# Patient Record
Sex: FEMALE | Race: WHITE | HISPANIC OR LATINO
[De-identification: names, ages, dates, MRNs, and addresses within clinical notes are randomized per-mention and may not be internally consistent; named-entity substitution may affect disease eponyms.]

---

## 2018-02-05 ENCOUNTER — APPOINTMENT (OUTPATIENT)
Dept: SURGERY | Facility: CLINIC | Age: 48
End: 2018-02-05

## 2022-02-07 ENCOUNTER — RESULT REVIEW (OUTPATIENT)
Age: 52
End: 2022-02-07

## 2022-02-07 ENCOUNTER — INPATIENT (INPATIENT)
Facility: HOSPITAL | Age: 52
LOS: 9 days | Discharge: HOME | DRG: 853 | End: 2022-02-17
Attending: INTERNAL MEDICINE | Admitting: HOSPITALIST
Payer: COMMERCIAL

## 2022-02-07 VITALS
DIASTOLIC BLOOD PRESSURE: 88 MMHG | RESPIRATION RATE: 30 BRPM | WEIGHT: 293 LBS | SYSTOLIC BLOOD PRESSURE: 149 MMHG | TEMPERATURE: 98 F | HEART RATE: 116 BPM

## 2022-02-07 DIAGNOSIS — F90.9 ATTENTION-DEFICIT HYPERACTIVITY DISORDER, UNSPECIFIED TYPE: ICD-10-CM

## 2022-02-07 DIAGNOSIS — R73.9 HYPERGLYCEMIA, UNSPECIFIED: ICD-10-CM

## 2022-02-07 DIAGNOSIS — Z98.84 BARIATRIC SURGERY STATUS: Chronic | ICD-10-CM

## 2022-02-07 DIAGNOSIS — A41.9 SEPSIS, UNSPECIFIED ORGANISM: ICD-10-CM

## 2022-02-07 DIAGNOSIS — F41.9 ANXIETY DISORDER, UNSPECIFIED: ICD-10-CM

## 2022-02-07 DIAGNOSIS — J90 PLEURAL EFFUSION, NOT ELSEWHERE CLASSIFIED: ICD-10-CM

## 2022-02-07 DIAGNOSIS — Z29.9 ENCOUNTER FOR PROPHYLACTIC MEASURES, UNSPECIFIED: ICD-10-CM

## 2022-02-07 DIAGNOSIS — E66.01 MORBID (SEVERE) OBESITY DUE TO EXCESS CALORIES: ICD-10-CM

## 2022-02-07 LAB
ALBUMIN SERPL ELPH-MCNC: 3.4 G/DL — SIGNIFICANT CHANGE UP (ref 3.3–5)
ALP SERPL-CCNC: 99 U/L — SIGNIFICANT CHANGE UP (ref 40–120)
ALT FLD-CCNC: 15 U/L — SIGNIFICANT CHANGE UP (ref 10–45)
ANION GAP SERPL CALC-SCNC: 15 MMOL/L — SIGNIFICANT CHANGE UP (ref 5–17)
APTT BLD: 30.2 SEC — SIGNIFICANT CHANGE UP (ref 27.5–35.5)
AST SERPL-CCNC: 8 U/L — LOW (ref 10–40)
BASOPHILS # BLD AUTO: 0 K/UL — SIGNIFICANT CHANGE UP (ref 0–0.2)
BASOPHILS NFR BLD AUTO: 0 % — SIGNIFICANT CHANGE UP (ref 0–2)
BILIRUB SERPL-MCNC: 1.1 MG/DL — SIGNIFICANT CHANGE UP (ref 0.2–1.2)
BUN SERPL-MCNC: 24 MG/DL — HIGH (ref 7–23)
CALCIUM SERPL-MCNC: 9.2 MG/DL — SIGNIFICANT CHANGE UP (ref 8.4–10.5)
CHLORIDE SERPL-SCNC: 94 MMOL/L — LOW (ref 96–108)
CO2 SERPL-SCNC: 21 MMOL/L — LOW (ref 22–31)
CREAT SERPL-MCNC: 1.11 MG/DL — SIGNIFICANT CHANGE UP (ref 0.5–1.3)
EOSINOPHIL # BLD AUTO: 0 K/UL — SIGNIFICANT CHANGE UP (ref 0–0.5)
EOSINOPHIL NFR BLD AUTO: 0 % — SIGNIFICANT CHANGE UP (ref 0–6)
FLUAV AG NPH QL: SIGNIFICANT CHANGE UP
FLUBV AG NPH QL: SIGNIFICANT CHANGE UP
GAS PNL BLDV: SIGNIFICANT CHANGE UP
GLUCOSE SERPL-MCNC: 184 MG/DL — HIGH (ref 70–99)
HCG SERPL-ACNC: <2 MIU/ML — SIGNIFICANT CHANGE UP
HCT VFR BLD CALC: 40.1 % — SIGNIFICANT CHANGE UP (ref 34.5–45)
HGB BLD-MCNC: 12.8 G/DL — SIGNIFICANT CHANGE UP (ref 11.5–15.5)
INR BLD: 1.35 RATIO — HIGH (ref 0.88–1.16)
LIDOCAIN IGE QN: 17 U/L — SIGNIFICANT CHANGE UP (ref 7–60)
LYMPHOCYTES # BLD AUTO: 1.14 K/UL — SIGNIFICANT CHANGE UP (ref 1–3.3)
LYMPHOCYTES # BLD AUTO: 6 % — LOW (ref 13–44)
MCHC RBC-ENTMCNC: 26.7 PG — LOW (ref 27–34)
MCHC RBC-ENTMCNC: 31.9 GM/DL — LOW (ref 32–36)
MCV RBC AUTO: 83.7 FL — SIGNIFICANT CHANGE UP (ref 80–100)
MONOCYTES # BLD AUTO: 1.64 K/UL — HIGH (ref 0–0.9)
MONOCYTES NFR BLD AUTO: 8.6 % — SIGNIFICANT CHANGE UP (ref 2–14)
NEUTROPHILS # BLD AUTO: 16.26 K/UL — HIGH (ref 1.8–7.4)
NEUTROPHILS NFR BLD AUTO: 85.4 % — HIGH (ref 43–77)
PLATELET # BLD AUTO: 397 K/UL — SIGNIFICANT CHANGE UP (ref 150–400)
POTASSIUM SERPL-MCNC: 4 MMOL/L — SIGNIFICANT CHANGE UP (ref 3.5–5.3)
POTASSIUM SERPL-SCNC: 4 MMOL/L — SIGNIFICANT CHANGE UP (ref 3.5–5.3)
PROT SERPL-MCNC: 7.3 G/DL — SIGNIFICANT CHANGE UP (ref 6–8.3)
PROTHROM AB SERPL-ACNC: 16 SEC — HIGH (ref 10.6–13.6)
RBC # BLD: 4.79 M/UL — SIGNIFICANT CHANGE UP (ref 3.8–5.2)
RBC # FLD: 13.4 % — SIGNIFICANT CHANGE UP (ref 10.3–14.5)
RSV RNA NPH QL NAA+NON-PROBE: SIGNIFICANT CHANGE UP
SARS-COV-2 RNA SPEC QL NAA+PROBE: SIGNIFICANT CHANGE UP
SODIUM SERPL-SCNC: 130 MMOL/L — LOW (ref 135–145)
TROPONIN T, HIGH SENSITIVITY RESULT: 9 NG/L — SIGNIFICANT CHANGE UP (ref 0–51)
WBC # BLD: 19.04 K/UL — HIGH (ref 3.8–10.5)
WBC # FLD AUTO: 19.04 K/UL — HIGH (ref 3.8–10.5)

## 2022-02-07 PROCEDURE — 99254 IP/OBS CNSLTJ NEW/EST MOD 60: CPT

## 2022-02-07 PROCEDURE — 99291 CRITICAL CARE FIRST HOUR: CPT

## 2022-02-07 PROCEDURE — 74177 CT ABD & PELVIS W/CONTRAST: CPT | Mod: 26,MA

## 2022-02-07 PROCEDURE — 71250 CT THORAX DX C-: CPT | Mod: 26,MA

## 2022-02-07 PROCEDURE — 99223 1ST HOSP IP/OBS HIGH 75: CPT

## 2022-02-07 PROCEDURE — 71046 X-RAY EXAM CHEST 2 VIEWS: CPT | Mod: 26

## 2022-02-07 RX ORDER — MIDAZOLAM HYDROCHLORIDE 1 MG/ML
2 INJECTION, SOLUTION INTRAMUSCULAR; INTRAVENOUS ONCE
Refills: 0 | Status: DISCONTINUED | OUTPATIENT
Start: 2022-02-07 | End: 2022-02-07

## 2022-02-07 RX ORDER — CEFTRIAXONE 500 MG/1
1000 INJECTION, POWDER, FOR SOLUTION INTRAMUSCULAR; INTRAVENOUS ONCE
Refills: 0 | Status: COMPLETED | OUTPATIENT
Start: 2022-02-07 | End: 2022-02-07

## 2022-02-07 RX ORDER — OXYCODONE HYDROCHLORIDE 5 MG/1
5 TABLET ORAL EVERY 4 HOURS
Refills: 0 | Status: DISCONTINUED | OUTPATIENT
Start: 2022-02-07 | End: 2022-02-11

## 2022-02-07 RX ORDER — DEXTROAMPHETAMINE SACCHARATE, AMPHETAMINE ASPARTATE, DEXTROAMPHETAMINE SULFATE AND AMPHETAMINE SULFATE 1.875; 1.875; 1.875; 1.875 MG/1; MG/1; MG/1; MG/1
20 TABLET ORAL
Refills: 0 | Status: DISCONTINUED | OUTPATIENT
Start: 2022-02-07 | End: 2022-02-11

## 2022-02-07 RX ORDER — BUPROPION HYDROCHLORIDE 150 MG/1
300 TABLET, EXTENDED RELEASE ORAL DAILY
Refills: 0 | Status: DISCONTINUED | OUTPATIENT
Start: 2022-02-07 | End: 2022-02-11

## 2022-02-07 RX ORDER — MORPHINE SULFATE 50 MG/1
4 CAPSULE, EXTENDED RELEASE ORAL EVERY 4 HOURS
Refills: 0 | Status: DISCONTINUED | OUTPATIENT
Start: 2022-02-07 | End: 2022-02-11

## 2022-02-07 RX ORDER — CEFTRIAXONE 500 MG/1
1000 INJECTION, POWDER, FOR SOLUTION INTRAMUSCULAR; INTRAVENOUS EVERY 24 HOURS
Refills: 0 | Status: DISCONTINUED | OUTPATIENT
Start: 2022-02-08 | End: 2022-02-11

## 2022-02-07 RX ORDER — AZITHROMYCIN 500 MG/1
500 TABLET, FILM COATED ORAL ONCE
Refills: 0 | Status: COMPLETED | OUTPATIENT
Start: 2022-02-07 | End: 2022-02-07

## 2022-02-07 RX ORDER — ACETAMINOPHEN 500 MG
650 TABLET ORAL EVERY 6 HOURS
Refills: 0 | Status: DISCONTINUED | OUTPATIENT
Start: 2022-02-07 | End: 2022-02-11

## 2022-02-07 RX ORDER — MORPHINE SULFATE 50 MG/1
4 CAPSULE, EXTENDED RELEASE ORAL ONCE
Refills: 0 | Status: DISCONTINUED | OUTPATIENT
Start: 2022-02-07 | End: 2022-02-07

## 2022-02-07 RX ORDER — AZITHROMYCIN 500 MG/1
500 TABLET, FILM COATED ORAL EVERY 24 HOURS
Refills: 0 | Status: DISCONTINUED | OUTPATIENT
Start: 2022-02-08 | End: 2022-02-11

## 2022-02-07 RX ORDER — SODIUM CHLORIDE 9 MG/ML
1000 INJECTION INTRAMUSCULAR; INTRAVENOUS; SUBCUTANEOUS ONCE
Refills: 0 | Status: COMPLETED | OUTPATIENT
Start: 2022-02-07 | End: 2022-02-07

## 2022-02-07 RX ORDER — ONDANSETRON 8 MG/1
4 TABLET, FILM COATED ORAL ONCE
Refills: 0 | Status: COMPLETED | OUTPATIENT
Start: 2022-02-07 | End: 2022-02-07

## 2022-02-07 RX ADMIN — SODIUM CHLORIDE 1000 MILLILITER(S): 9 INJECTION INTRAMUSCULAR; INTRAVENOUS; SUBCUTANEOUS at 13:48

## 2022-02-07 RX ADMIN — CEFTRIAXONE 100 MILLIGRAM(S): 500 INJECTION, POWDER, FOR SOLUTION INTRAMUSCULAR; INTRAVENOUS at 17:46

## 2022-02-07 RX ADMIN — ONDANSETRON 4 MILLIGRAM(S): 8 TABLET, FILM COATED ORAL at 13:48

## 2022-02-07 RX ADMIN — MIDAZOLAM HYDROCHLORIDE 2 MILLIGRAM(S): 1 INJECTION, SOLUTION INTRAMUSCULAR; INTRAVENOUS at 23:08

## 2022-02-07 RX ADMIN — MORPHINE SULFATE 4 MILLIGRAM(S): 50 CAPSULE, EXTENDED RELEASE ORAL at 13:48

## 2022-02-07 RX ADMIN — MORPHINE SULFATE 4 MILLIGRAM(S): 50 CAPSULE, EXTENDED RELEASE ORAL at 20:31

## 2022-02-07 RX ADMIN — AZITHROMYCIN 255 MILLIGRAM(S): 500 TABLET, FILM COATED ORAL at 17:29

## 2022-02-07 NOTE — ED ADULT NURSE REASSESSMENT NOTE - NS ED NURSE REASSESS COMMENT FT1
pt to BR sob 02 sat decreased 88% r/a. Dyspnea. Made comfortable. Prior to BR 02 sat 93% r/a Oxygen delivered n/c 4 l 02 sat 97%. HOB elevated.

## 2022-02-07 NOTE — ED ADULT TRIAGE NOTE - CHIEF COMPLAINT QUOTE
Chest Pain beginning this AM radiating to L shoulder, SOB. Abdominal since Thursday worsening, lap band surgery in 2010

## 2022-02-07 NOTE — CONSULT NOTE ADULT - ASSESSMENT
51F with large left sided pleural effusion.    Recommendations:  - Will place a left sided thoracostomy tube    FULL CONSULT NOTE TO FOLLOW.    LAURA Carbone, PGY-2  [facility]  Thoracic Surgery  [id number] 51F with large left sided pleural effusion.    - Left sided thoracostomy tube placed, 250 cc straw colored fluid drained and sent for culture, cytology, cell count, protein, LDH  - Will follow    Thoracic Surgery  [id number]

## 2022-02-07 NOTE — CONSULT NOTE ADULT - SUBJECTIVE AND OBJECTIVE BOX
Thoracic Surgery Consult Note  Attending: Dr. Yulisa Willett  Service: Thoracic surgery  [id number]    HPI: 51F w/ PMHx of depression, obesity s/p lap band by Dr. Acevedo in 2010 who presents to the ED with a 4 day history of vague epigastric/LUQ pain that radiated to the left chest and scapula.    PAST MEDICAL & SURGICAL HISTORY:  No pertinent past medical history  H/O laparoscopic adjustable gastric banding    ALLERGIES:  No Known Allergies    PHYSICAL EXAM:  General: NAD, resting comfortably  HEENT: NC/AT, EOMI, normal hearing, no oral lesions, no LAD, neck supple  Pulmonary: Breathing comfortably  Cardiovascular: NSR, no murmurs  Abdominal: soft, mild LUQ tenderness, no organomegaly  Extremities: WWP, normal strength, no clubbing/cyanosis/edema  Pulses: palpable distal pulses    VITAL SIGNS:  Vital Signs Last 24 Hrs  T(C): 36.8 (07 Feb 2022 12:40), Max: 36.8 (07 Feb 2022 12:40)  T(F): 98.3 (07 Feb 2022 12:40), Max: 98.3 (07 Feb 2022 12:40)  HR: 110 (07 Feb 2022 13:54) (110 - 116)  BP: 153/85 (07 Feb 2022 13:54) (149/88 - 153/85)  BP(mean): --  RR: 22 (07 Feb 2022 13:54) (22 - 30)  SpO2: 97% (07 Feb 2022 13:54) (97% - 97%)    I&O's Summary    LABS:                        12.8   19.04 )-----------( 397      ( 07 Feb 2022 13:48 )             40.1     02-07    130<L>  |  94<L>  |  24<H>  ----------------------------<  184<H>  4.0   |  21<L>  |  1.11    Ca    9.2      07 Feb 2022 13:48    TPro  7.3  /  Alb  3.4  /  TBili  1.1  /  DBili  x   /  AST  8<L>  /  ALT  15  /  AlkPhos  99  02-07    PT/INR - ( 07 Feb 2022 13:48 )   PT: 16.0 sec;   INR: 1.35 ratio         PTT - ( 07 Feb 2022 13:48 )  PTT:30.2 sec    CAPILLARY BLOOD GLUCOSE        LIVER FUNCTIONS - ( 07 Feb 2022 13:48 )  Alb: 3.4 g/dL / Pro: 7.3 g/dL / ALK PHOS: 99 U/L / ALT: 15 U/L / AST: 8 U/L / GGT: x             CULTURES:      RADIOLOGY & ADDITIONAL STUDIES:             Thoracic Surgery Consult Note  Attending: Dr. Yulisa Willett  Service: Thoracic surgery  [id number]    HPI: 51F w/ PMHx of depression, obesity s/p lap band by Dr. Acevedo in 2010 who presents to the ED with a 4 day history of vague epigastric/LUQ pain that radiated to the left chest and scapula. Patient refers having poor PO intake, some nausea but no vomiting, passing gas and having BMs.     In the ED, patient was afebrile, tachycardic to 110s, normotensive, labs remarkable for leukocytosis of 19k, otherwise WNL. CTAP revealed oral contrast revealed adequate lap band placement, contrast in stomach, small bowel, and esophagus. Also, large left pleural effusion is partially appreciated. Thoracic surgery consulted for evaluation of pleural effusion.    PAST MEDICAL & SURGICAL HISTORY:  No pertinent past medical history  H/O laparoscopic adjustable gastric banding    ALLERGIES:  No Known Allergies    PHYSICAL EXAM:  General: NAD, resting comfortably  HEENT: NC/AT, EOMI, normal hearing, no oral lesions, no LAD, neck supple  Pulmonary: Breathing comfortably  Cardiovascular: NSR, no murmurs  Abdominal: soft, mild LUQ tenderness, no organomegaly  Extremities: WWP, normal strength, no clubbing/cyanosis/edema  Pulses: palpable distal pulses    VITAL SIGNS:  Vital Signs Last 24 Hrs  T(C): 36.8 (07 Feb 2022 12:40), Max: 36.8 (07 Feb 2022 12:40)  T(F): 98.3 (07 Feb 2022 12:40), Max: 98.3 (07 Feb 2022 12:40)  HR: 110 (07 Feb 2022 13:54) (110 - 116)  BP: 153/85 (07 Feb 2022 13:54) (149/88 - 153/85)  BP(mean): --  RR: 22 (07 Feb 2022 13:54) (22 - 30)  SpO2: 97% (07 Feb 2022 13:54) (97% - 97%)    I&O's Summary    LABS:                        12.8   19.04 )-----------( 397      ( 07 Feb 2022 13:48 )             40.1     02-07    130<L>  |  94<L>  |  24<H>  ----------------------------<  184<H>  4.0   |  21<L>  |  1.11    Ca    9.2      07 Feb 2022 13:48    TPro  7.3  /  Alb  3.4  /  TBili  1.1  /  DBili  x   /  AST  8<L>  /  ALT  15  /  AlkPhos  99  02-07    PT/INR - ( 07 Feb 2022 13:48 )   PT: 16.0 sec;   INR: 1.35 ratio         PTT - ( 07 Feb 2022 13:48 )  PTT:30.2 sec    CAPILLARY BLOOD GLUCOSE        LIVER FUNCTIONS - ( 07 Feb 2022 13:48 )  Alb: 3.4 g/dL / Pro: 7.3 g/dL / ALK PHOS: 99 U/L / ALT: 15 U/L / AST: 8 U/L / GGT: x             CULTURES:      RADIOLOGY & ADDITIONAL STUDIES:    CT Abdomen and Pelvis w/ Oral Cont and w/ IV Cont (02.07.22 @ 15:10)    FINDINGS:  LOWER CHEST: Corresponding to earlier same-day chest radiograph, there is   a moderate to large loculated left pleural effusion with compressive   atelectasis of thevisualized left lung. Superimposed pneumonia cannot be   excluded. Right middle lobe subsegmental atelectasis.    LIVER: Within normal limits.  BILE DUCTS: Normal caliber.  GALLBLADDER: Within normal limits.  SPLEEN: Within normal limits.  PANCREAS: Within normal limits.  ADRENALS: Within normal limits.  KIDNEYS/URETERS: Within normal limits.    BLADDER: Within normal limits.  REPRODUCTIVE ORGANS: Normal uterus.    BOWEL: Status post gastric lap band, which demonstrates the "O sign" on   coronal imaging, which may be seen with band slippage. However, the   majority of the stomach remains below the gastric band, consistent with   appropriate lap band position. There is contrast in the visualized   minimally distended esophagus. No bowel obstruction. Appendix is normal.  PERITONEUM: No ascites.  VESSELS: Within normal limits.  RETROPERITONEUM/LYMPH NODES: No lymphadenopathy.  ABDOMINAL WALL: Within normal limits.  BONES: Degenerative changes.    IMPRESSION:  Redemonstrated left pleural effusion with adjacent compressive   atelectasis. Superimposed pneumonia cannot be excluded. Correlate   clinically.    Gastric lap band demonstrates O configuration, but there is no evidence   of slippage. Presence of enteric contrast in the mildly distended   visualized esophagus, may be seen in the setting of slow transit versus   reflux. Correlate clinically.

## 2022-02-07 NOTE — ED PROVIDER NOTE - PROGRESS NOTE DETAILS
Ramo Gutiérrez MD:  General surgery paged consulted, okay with 1 hour post PO contrast scan, primary concern for lap band complication Attending MD Harley: received care of patient in sign out. Patient here with left sided chest and LUQ pain. CT and CXR notable for moderate loculated pleural effusion, elevated white count 19. CT chest performed, pending results. Ordered for abx in the event we are dealing with parapneumonic effusion vs empyema (though no obvious reason for empyema historically). Patient at rest is not in distress, but with exertion desats to low 80s and tachypneic, placed on NC 2L, advised bedrest for now. Patient pending thoracic surgery consultation to determine next best course of action (thoracentesis vs. pigtail drainage vs. open tube thoracostomy vs. VATS). Ramo Gutiérrez MD:  Admitted to hospitalist Ramo Gutiérrez MD:  Discussed with general surgery resident, states not surgical patient at this time,awaiting thoracentesis placement

## 2022-02-07 NOTE — H&P ADULT - NSHPPHYSICALEXAM_GEN_ALL_CORE
Vital Signs Last 24 Hrs  T(C): 36.8 (07 Feb 2022 12:40), Max: 36.8 (07 Feb 2022 12:40)  T(F): 98.3 (07 Feb 2022 12:40), Max: 98.3 (07 Feb 2022 12:40)  HR: 102 (07 Feb 2022 19:40) (102 - 116)  BP: 123/80 (07 Feb 2022 19:40) (123/80 - 153/85)  BP(mean): 91 (07 Feb 2022 19:40) (91 - 91)  RR: 19 (07 Feb 2022 19:40) (18 - 30)  SpO2: 99% (07 Feb 2022 19:40) (96% - 100%) on 2L NC    GENERAL: NAD, well-developed  HEAD:  Atraumatic, Normocephalic  EYES: EOMI, PERRL, conjunctiva and sclera clear  ENMT: MMM, good dentition  NECK: Supple, trachea midline  Lung: normal work of breathing on nasal cannula, left lung without breath sounds posteriorly from base to scapula- however is present anterior and at apex  Cardiovascular: S1&S2+, rrr, no m/r/g appreciated; no pitting edema appreciated in b/l LE  ABDOMEN: bs+, soft, nt, nd, no appreciable masses  : No alas catheter, no CVA tenderness  Neuro: A&Ox3, no flaccid paralysis in extremities appreciated  SKIN: warm and dry, no visible purulence in exposed areas, normal skin turgor

## 2022-02-07 NOTE — ED PROVIDER NOTE - CLINICAL SUMMARY MEDICAL DECISION MAKING FREE TEXT BOX
51 Y F presenting with midepigastric pain, primary concern for lap band associated malfunction vs. primary cardiac (ekg normal, non-exertional), low clinical concern for aortic dissection (no back pain, pulses are normal B/L no vision loss or arm weakness). 51 Y F presenting with midepigastric pain, primary concern for lap band associated malfunction vs. primary cardiac (ekg normal, non-exertional), low clinical concern for aortic dissection (no back pain, pulses are normal B/L no vision loss or arm weakness).    HARMAN Stout MD: Agree with resident/ACP MDM, assessment and plan as above. Pt with h/o lap band (2010) presents with LUQ pain radiating to L shoulder. Plan: cardiac w/u, CTAP, basic labs, bariatric surgery consult, reassess

## 2022-02-07 NOTE — ED PROVIDER NOTE - NS ED ROS FT
GENERAL: No fever or chills  EYES: No change in vision  HEENT: No trouble swallowing or speaking  CARDIAC: + chest pain  PULMONARY: No cough or SOB  GI: + abdominal pain, + nausea + vomiting, no diarrhea or constipation  : No changes in urination  SKIN: No rashes  NEURO: No headache, No change in sensation B/L upper extremities  MSK: No joint pain  Otherwise as HPI or negative.

## 2022-02-07 NOTE — CONSULT NOTE ADULT - ASSESSMENT
51F w/ hx of gastric lap band by Dr. Acevedo in 2010 who now presents with poor PO intake, nausea but no vomiting and epigastric abdominal pain.    Recommendations:  - Lap band deflation attempted by bariatric surgery team at bedside however unable to succeed.  - Recommend IR consultation for deflation of lap band under fluoroscopy.  - No further bariatric surgery interventions at this time.    Discussed with MIS fellow.    LAURA Carbone, PGY-2   [facility]   [facility] Team Surgery   [id number]

## 2022-02-07 NOTE — H&P ADULT - ASSESSMENT
51F w/ class III obesity s/p miguel (2010) p/w sob, abdominal pain and chest pain found to have left-sided loculated pleural effusion with sepsis

## 2022-02-07 NOTE — ED ADULT NURSE NOTE - OBJECTIVE STATEMENT
51 yr old female to Ed with c/o LUQ pain since Thursday has H/O  Gastric Bypass x2010. Has not f/u in "quite awhile " per pt. Denies fever or chills Denies recent travel. Denies smoking No tenderness to touch No rebound tenderness C/o nausea with " spitting" up. Denies change in BM No bleeding noted. 51 yr old female to Ed with c/o LUQ pain since Thursday has H/O  Gastric Bypass x2010. Has not f/u in "quite awhile " per pt. Denies fever or chills Denies recent travel. Denies smoking No tenderness to touch No rebound tenderness C/o nausea with " spitting" up. Denies change in BM No rectal bleeding noted.

## 2022-02-07 NOTE — H&P ADULT - NSHPLABSRESULTS_GEN_ALL_CORE
Personally reviewed available labs, imaging and ekg  [1]  CBC Full  -  ( 07 Feb 2022 13:48 )  WBC Count : 19.04 K/uL  RBC Count : 4.79 M/uL  Hemoglobin : 12.8 g/dL  Hematocrit : 40.1 %  Platelet Count - Automated : 397 K/uL  Mean Cell Volume : 83.7 fl  Mean Cell Hemoglobin : 26.7 pg  Mean Cell Hemoglobin Concentration : 31.9 gm/dL  Auto Neutrophil # : 16.26 K/uL  Auto Lymphocyte # : 1.14 K/uL  Auto Monocyte # : 1.64 K/uL  Auto Eosinophil # : 0.00 K/uL  Auto Basophil # : 0.00 K/uL  Auto Neutrophil % : 85.4 %  Auto Lymphocyte % : 6.0 %  Auto Monocyte % : 8.6 %  Auto Eosinophil % : 0.0 %  Auto Basophil % : 0.0 %    02-07    130<L>  |  94<L>  |  24<H>  ----------------------------<  184<H>  4.0   |  21<L>  |  1.11    Ca    9.2      07 Feb 2022 13:48    TPro  7.3  /  Alb  3.4  /  TBili  1.1  /  DBili  x   /  AST  8<L>  /  ALT  15  /  AlkPhos  99  02-07  PT/INR - ( 07 Feb 2022 13:48 )   PT: 16.0 sec;   INR: 1.35 ratio    PTT - ( 07 Feb 2022 13:48 )  PTT:30.2 sec        Imaging:  [ 2] I independently reviewed CT chest which demonstrates left pleural effusion    EKG:  [2] I independently reviewed EKG/cardiac tracing and sinus tachycardia present    Review of old records:  [2] I personally reviewed previous records which identified history of bariatric surgery

## 2022-02-07 NOTE — PROCEDURE NOTE - ADDITIONAL PROCEDURE DETAILS
250 cc straw colored fluid drained on insertion of tube. Fluid sent for cytology, cell count, culture, total protein, LDH. n/a

## 2022-02-07 NOTE — CONSULT NOTE ADULT - SUBJECTIVE AND OBJECTIVE BOX
Bariatric Surgery Consult Note  Attending: Dr. Dill  Service: [place] Team Surgery  [id number]    HPI: 51F w/ PMHx of depression, obesity s/p lap band by Dr. Acevedo in 2010 who presents to the ED with a 4 day history of vague epigastric/LUQ pain that radiated to the left chest and scapula. Patient refers having poor PO intake, some nausea but no vomiting, passing gas and having BMs.     In the ED, patient was afebrile, tachycardic to 110s, normotensive, labs remarkable for leukocytosis of 19k, otherwise WNL. CTAP revealed oral contrast revealed adequate lap band placement, contrast in stomach, small bowel, and esophagus. Also, large left pleural effusion is partially appreciated.     PAST MEDICAL & SURGICAL HISTORY:  No pertinent past medical history  H/O laparoscopic adjustable gastric banding    ALLERGIES:  No Known Allergies    Intolerances    PHYSICAL EXAM:  General: NAD, resting comfortably  HEENT: NC/AT, EOMI, normal hearing, no oral lesions, no LAD, neck supple  Pulmonary: Breathing comfortably  Cardiovascular: NSR, no murmurs  Abdominal: soft, mild LUQ tenderness, no organomegaly  Extremities: WWP, normal strength, no clubbing/cyanosis/edema  Pulses: palpable distal pulses    VITAL SIGNS:  Vital Signs Last 24 Hrs  T(C): 36.8 (07 Feb 2022 12:40), Max: 36.8 (07 Feb 2022 12:40)  T(F): 98.3 (07 Feb 2022 12:40), Max: 98.3 (07 Feb 2022 12:40)  HR: 112 (07 Feb 2022 17:23) (110 - 116)  BP: 150/110 (07 Feb 2022 17:23) (149/88 - 153/85)  BP(mean): --  RR: 24 (07 Feb 2022 17:23) (22 - 30)  SpO2: 96% (07 Feb 2022 17:23) (96% - 97%)    I&O's Summary      LABS:                        12.8   19.04 )-----------( 397      ( 07 Feb 2022 13:48 )             40.1     02-07    130<L>  |  94<L>  |  24<H>  ----------------------------<  184<H>  4.0   |  21<L>  |  1.11    Ca    9.2      07 Feb 2022 13:48    TPro  7.3  /  Alb  3.4  /  TBili  1.1  /  DBili  x   /  AST  8<L>  /  ALT  15  /  AlkPhos  99  02-07    PT/INR - ( 07 Feb 2022 13:48 )   PT: 16.0 sec;   INR: 1.35 ratio         PTT - ( 07 Feb 2022 13:48 )  PTT:30.2 sec    CAPILLARY BLOOD GLUCOSE        LIVER FUNCTIONS - ( 07 Feb 2022 13:48 )  Alb: 3.4 g/dL / Pro: 7.3 g/dL / ALK PHOS: 99 U/L / ALT: 15 U/L / AST: 8 U/L / GGT: x             CULTURES:      RADIOLOGY & ADDITIONAL STUDIES:    CT Abdomen and Pelvis w/ Oral Cont and w/ IV Cont (02.07.22 @ 15:10)    FINDINGS:  LOWER CHEST: Corresponding to earlier same-day chest radiograph, there is   a moderate to large loculated left pleural effusion with compressive   atelectasis of thevisualized left lung. Superimposed pneumonia cannot be   excluded. Right middle lobe subsegmental atelectasis.    LIVER: Within normal limits.  BILE DUCTS: Normal caliber.  GALLBLADDER: Within normal limits.  SPLEEN: Within normal limits.  PANCREAS: Within normal limits.  ADRENALS: Within normal limits.  KIDNEYS/URETERS: Within normal limits.    BLADDER: Within normal limits.  REPRODUCTIVE ORGANS: Normal uterus.    BOWEL: Status post gastric lap band, which demonstrates the "O sign" on   coronal imaging, which may be seen with band slippage. However, the   majority of the stomach remains below the gastric band, consistent with   appropriate lap band position. There is contrast in the visualized   minimally distended esophagus. No bowel obstruction. Appendix is normal.  PERITONEUM: No ascites.  VESSELS: Within normal limits.  RETROPERITONEUM/LYMPH NODES: No lymphadenopathy.  ABDOMINAL WALL: Within normal limits.  BONES: Degenerative changes.    IMPRESSION:  Redemonstrated left pleural effusion with adjacent compressive   atelectasis. Superimposed pneumonia cannot be excluded. Correlate   clinically.    Gastric lap band demonstrates O configuration, but there is no evidence   of slippage. Presence of enteric contrast in the mildly distended   visualized esophagus, may be seen in the setting of slow transit versus   reflux. Correlate clinically.

## 2022-02-07 NOTE — H&P ADULT - PROBLEM SELECTOR PLAN 1
- CT surgery consulted and plan is for chest tube  - c/w ceftriaxone and azithromycin  - blood cultures collected  - c/w morphine for severe pain, oxycodone for moderate pain, acetaminophen for mild pain and fever

## 2022-02-07 NOTE — H&P ADULT - NSICDXPASTMEDICALHX_GEN_ALL_CORE_FT
PAST MEDICAL HISTORY:  ADHD (attention deficit hyperactivity disorder)     Anxiety and depression     Class 3 severe obesity in adult

## 2022-02-07 NOTE — ED PROVIDER NOTE - OBJECTIVE STATEMENT
51 Y F H/O Lap band in 2010, poor compliance with followup with surgery and PMD, presenting with mid epigastric/LUQ pain. States since 4 days ago experienced mid epigastric/LUQ abdominal pain, nausea, inability to tolerate PO. Pain is intermittently radiating to L. chest. Denies any vision loss, arm numbness, back pain, passing gas. 51 Y F H/O Lap band in 2010, poor compliance with followup with surgery and PMD, presenting with mid epigastric/LUQ pain/SOB. States since 4 days ago experienced mid epigastric/LUQ abdominal pain, nausea, inability to tolerate PO. Pain is intermittently radiating to L. chest. Denies any vision loss, arm numbness, back pain, passing gas.

## 2022-02-07 NOTE — H&P ADULT - NSHPADDITIONALINFOADULT_GEN_ALL_CORE
Alfred Kenney MD  Medicine Attending  Department of Hospital Medicine  pager: [phone] (available from 20:00 to 08:00)

## 2022-02-07 NOTE — ED PROVIDER NOTE - CARE PLAN
Principal Discharge DX:	Large pleural effusion  Secondary Diagnosis:	Epigastric pain  Secondary Diagnosis:	Nausea & vomiting  Secondary Diagnosis:	Hypoxia   1

## 2022-02-07 NOTE — H&P ADULT - PROBLEM SELECTOR PLAN 3
monitor glucose- if persistently elevated then will need to start insulin sliding scale  check HbA1c  consistent carb diet

## 2022-02-07 NOTE — H&P ADULT - NSHPREVIEWOFSYSTEMS_GEN_ALL_CORE
CONSTITUTIONAL: fever+, chills+  EYES: No eye pain, no acute blindness  ENMT: no pain in mouth, no cuts on tongue  RESPIRATORY: No cough, sob+  CARDIOVASCULAR: CP+, no palpitations  GASTROINTESTINAL: abdominal pain+, no v/d, nausea+  GENITOURINARY: No dysuria, no hematuria  Heme/Lymph: No easy bruising, no swelling of neck lymph nodes  NEUROLOGICAL: No seizure, No acute paralysis  SKIN: No itching, no rashes  MUSCULOSKELETAL: No acute joint pain, no joint swelling

## 2022-02-07 NOTE — CONSULT NOTE ADULT - ATTENDING COMMENTS
patient with subjective fevers/chills at home, pain on her left chest/back.   CXR demonstrating large pleural effusion, pending pigtail placement .   discussed possible need for surgery if not completely evacuated with pigtail.

## 2022-02-07 NOTE — H&P ADULT - HISTORY OF PRESENT ILLNESS
51F w/ class III obesity s/p lapband (2010) p/w sob, abdominal pain and chest pain. The patient reports she developed on 2/3/22 abdominal discomfort in LUQ described as gas sensation similar to when she was postop after lapband which was intermittent and mild to moderate severity initially. She then noticed on 2/5/22 that the pain radiated to left chest wall and felt an intermittent chest tightness, restricted deep breath, 10/10 severe at times, and associated w/ sob. The discomfort and sob occurred at risk and progressed leading the patient to seek medical attention. On ROS, the patient does reports fever and chills during this time but denies cough, hemoptysis, vomiting, diarrhea, palpitations, or painful urination. The patient denies sick contacts, hx of hospitalization, cigarette smoking, family history of lung disease, weight loss, night sweats, or travel outside of [place]. The patient significantly has not followed with a primary care doctor and has not completed age appropriate screening with mammography, pap smear or colonoscopy.

## 2022-02-07 NOTE — ED PROVIDER NOTE - PHYSICAL EXAMINATION
Physical Exam:  General: NAD, Conversive, morbid obesity  Eyes: EOMI, Conjunctiva and sclera clear  Neck: No JVD  Lungs: Clear to auscultation bilaterally, no wheeze, no rhonchi  Heart: Normal S1, S2, no murmurs  Abdomen: Soft, mild midepigastric tenderness, nondistended  Extremities: 2+ peripheral pulses, no edema  Psych: AAO X3  Neurologic: Non-focal

## 2022-02-07 NOTE — H&P ADULT - PROBLEM SELECTOR PLAN 2
leukocytosis, tachycardia, loculated pleural effusion  - c/w antibiotics as noted above  - blood cultures collected  in the ED  - lactate wnl. patient appears euvolemic w/ 1L NS given in the ED and therefore will hold off from 30cc/kg fluid resuscitation.

## 2022-02-08 LAB
ALBUMIN SERPL ELPH-MCNC: 3.4 G/DL — SIGNIFICANT CHANGE UP (ref 3.3–5)
ALP SERPL-CCNC: 101 U/L — SIGNIFICANT CHANGE UP (ref 40–120)
ALT FLD-CCNC: 11 U/L — SIGNIFICANT CHANGE UP (ref 10–45)
ANION GAP SERPL CALC-SCNC: 13 MMOL/L — SIGNIFICANT CHANGE UP (ref 5–17)
APTT BLD: 23.6 SEC — LOW (ref 27.5–35.5)
AST SERPL-CCNC: 10 U/L — SIGNIFICANT CHANGE UP (ref 10–40)
B PERT IGG+IGM PNL SER: ABNORMAL
BASOPHILS # BLD AUTO: 0.01 K/UL — SIGNIFICANT CHANGE UP (ref 0–0.2)
BASOPHILS NFR BLD AUTO: 0.1 % — SIGNIFICANT CHANGE UP (ref 0–2)
BILIRUB SERPL-MCNC: 1 MG/DL — SIGNIFICANT CHANGE UP (ref 0.2–1.2)
BUN SERPL-MCNC: 21 MG/DL — SIGNIFICANT CHANGE UP (ref 7–23)
CALCIUM SERPL-MCNC: 8.8 MG/DL — SIGNIFICANT CHANGE UP (ref 8.4–10.5)
CHLORIDE SERPL-SCNC: 96 MMOL/L — SIGNIFICANT CHANGE UP (ref 96–108)
CK MB BLD-MCNC: 3.1 % — SIGNIFICANT CHANGE UP (ref 0–3.5)
CK MB CFR SERPL CALC: 1 NG/ML — SIGNIFICANT CHANGE UP (ref 0–3.8)
CK SERPL-CCNC: 32 U/L — SIGNIFICANT CHANGE UP (ref 25–170)
CO2 SERPL-SCNC: 21 MMOL/L — LOW (ref 22–31)
COLOR FLD: YELLOW — SIGNIFICANT CHANGE UP
CREAT SERPL-MCNC: 0.9 MG/DL — SIGNIFICANT CHANGE UP (ref 0.5–1.3)
EOSINOPHIL # BLD AUTO: 0.03 K/UL — SIGNIFICANT CHANGE UP (ref 0–0.5)
EOSINOPHIL # FLD: 1 % — SIGNIFICANT CHANGE UP
EOSINOPHIL NFR BLD AUTO: 0.2 % — SIGNIFICANT CHANGE UP (ref 0–6)
FLUID INTAKE SUBSTANCE CLASS: SIGNIFICANT CHANGE UP
FLUID SEGMENTED GRANULOCYTES: 64 % — SIGNIFICANT CHANGE UP
GLUCOSE SERPL-MCNC: 164 MG/DL — HIGH (ref 70–99)
GRAM STN FLD: SIGNIFICANT CHANGE UP
GRAM STN FLD: SIGNIFICANT CHANGE UP
HCT VFR BLD CALC: 38.9 % — SIGNIFICANT CHANGE UP (ref 34.5–45)
HGB BLD-MCNC: 12.5 G/DL — SIGNIFICANT CHANGE UP (ref 11.5–15.5)
IMM GRANULOCYTES NFR BLD AUTO: 0.7 % — SIGNIFICANT CHANGE UP (ref 0–1.5)
INR BLD: 1.23 RATIO — HIGH (ref 0.88–1.16)
LDH SERPL L TO P-CCNC: 1351 U/L — SIGNIFICANT CHANGE UP
LDH SERPL L TO P-CCNC: 159 U/L — SIGNIFICANT CHANGE UP (ref 50–242)
LYMPHOCYTES # BLD AUTO: 0.96 K/UL — LOW (ref 1–3.3)
LYMPHOCYTES # BLD AUTO: 5.9 % — LOW (ref 13–44)
LYMPHOCYTES # FLD: 30 % — SIGNIFICANT CHANGE UP
MAGNESIUM SERPL-MCNC: 1.8 MG/DL — SIGNIFICANT CHANGE UP (ref 1.6–2.6)
MCHC RBC-ENTMCNC: 27.4 PG — SIGNIFICANT CHANGE UP (ref 27–34)
MCHC RBC-ENTMCNC: 32.1 GM/DL — SIGNIFICANT CHANGE UP (ref 32–36)
MCV RBC AUTO: 85.1 FL — SIGNIFICANT CHANGE UP (ref 80–100)
MONOCYTES # BLD AUTO: 2.16 K/UL — HIGH (ref 0–0.9)
MONOCYTES NFR BLD AUTO: 13.3 % — SIGNIFICANT CHANGE UP (ref 2–14)
MONOS+MACROS # FLD: 5 % — SIGNIFICANT CHANGE UP
NEUTROPHILS # BLD AUTO: 13 K/UL — HIGH (ref 1.8–7.4)
NEUTROPHILS NFR BLD AUTO: 79.8 % — HIGH (ref 43–77)
NRBC # BLD: 0 /100 WBCS — SIGNIFICANT CHANGE UP (ref 0–0)
PHOSPHATE SERPL-MCNC: 3.9 MG/DL — SIGNIFICANT CHANGE UP (ref 2.5–4.5)
PLATELET # BLD AUTO: 400 K/UL — SIGNIFICANT CHANGE UP (ref 150–400)
POTASSIUM SERPL-MCNC: 4.4 MMOL/L — SIGNIFICANT CHANGE UP (ref 3.5–5.3)
POTASSIUM SERPL-SCNC: 4.4 MMOL/L — SIGNIFICANT CHANGE UP (ref 3.5–5.3)
PROT FLD-MCNC: 5.7 G/DL — SIGNIFICANT CHANGE UP
PROT SERPL-MCNC: 7.3 G/DL — SIGNIFICANT CHANGE UP (ref 6–8.3)
PROTHROM AB SERPL-ACNC: 14.6 SEC — HIGH (ref 10.6–13.6)
RBC # BLD: 4.57 M/UL — SIGNIFICANT CHANGE UP (ref 3.8–5.2)
RBC # FLD: 13.5 % — SIGNIFICANT CHANGE UP (ref 10.3–14.5)
RCV VOL RI: 1070 /UL — HIGH (ref 0–0)
SODIUM SERPL-SCNC: 130 MMOL/L — LOW (ref 135–145)
SPECIMEN SOURCE: SIGNIFICANT CHANGE UP
TOTAL NUCLEATED CELL COUNT, BODY FLUID: 1590 /UL — SIGNIFICANT CHANGE UP
TROPONIN T, HIGH SENSITIVITY RESULT: 8 NG/L — SIGNIFICANT CHANGE UP (ref 0–51)
TUBE TYPE: SIGNIFICANT CHANGE UP
WBC # BLD: 16.27 K/UL — HIGH (ref 3.8–10.5)
WBC # FLD AUTO: 16.27 K/UL — HIGH (ref 3.8–10.5)

## 2022-02-08 PROCEDURE — 99232 SBSQ HOSP IP/OBS MODERATE 35: CPT | Mod: 57

## 2022-02-08 PROCEDURE — 88305 TISSUE EXAM BY PATHOLOGIST: CPT | Mod: 26

## 2022-02-08 PROCEDURE — 71045 X-RAY EXAM CHEST 1 VIEW: CPT | Mod: 26

## 2022-02-08 PROCEDURE — 99231 SBSQ HOSP IP/OBS SF/LOW 25: CPT | Mod: GC

## 2022-02-08 PROCEDURE — 71045 X-RAY EXAM CHEST 1 VIEW: CPT | Mod: 26,77

## 2022-02-08 PROCEDURE — 93010 ELECTROCARDIOGRAM REPORT: CPT

## 2022-02-08 PROCEDURE — ZZZZZ: CPT

## 2022-02-08 PROCEDURE — 88341 IMHCHEM/IMCYTCHM EA ADD ANTB: CPT | Mod: 26

## 2022-02-08 PROCEDURE — 88112 CYTOPATH CELL ENHANCE TECH: CPT | Mod: 26

## 2022-02-08 PROCEDURE — 74240 X-RAY XM UPR GI TRC 1CNTRST: CPT | Mod: 26

## 2022-02-08 PROCEDURE — 43999 UNLISTED PROCEDURE STOMACH: CPT

## 2022-02-08 PROCEDURE — 88342 IMHCHEM/IMCYTCHM 1ST ANTB: CPT | Mod: 26

## 2022-02-08 RX ORDER — INFLUENZA VIRUS VACCINE 15; 15; 15; 15 UG/.5ML; UG/.5ML; UG/.5ML; UG/.5ML
0.5 SUSPENSION INTRAMUSCULAR ONCE
Refills: 0 | Status: DISCONTINUED | OUTPATIENT
Start: 2022-02-08 | End: 2022-02-17

## 2022-02-08 RX ORDER — ONDANSETRON 8 MG/1
4 TABLET, FILM COATED ORAL EVERY 8 HOURS
Refills: 0 | Status: DISCONTINUED | OUTPATIENT
Start: 2022-02-08 | End: 2022-02-11

## 2022-02-08 RX ORDER — ONDANSETRON 8 MG/1
4 TABLET, FILM COATED ORAL ONCE
Refills: 0 | Status: COMPLETED | OUTPATIENT
Start: 2022-02-08 | End: 2022-02-08

## 2022-02-08 RX ADMIN — MORPHINE SULFATE 4 MILLIGRAM(S): 50 CAPSULE, EXTENDED RELEASE ORAL at 15:09

## 2022-02-08 RX ADMIN — Medication 650 MILLIGRAM(S): at 21:48

## 2022-02-08 RX ADMIN — MORPHINE SULFATE 4 MILLIGRAM(S): 50 CAPSULE, EXTENDED RELEASE ORAL at 17:22

## 2022-02-08 RX ADMIN — ONDANSETRON 4 MILLIGRAM(S): 8 TABLET, FILM COATED ORAL at 00:25

## 2022-02-08 RX ADMIN — ONDANSETRON 4 MILLIGRAM(S): 8 TABLET, FILM COATED ORAL at 04:57

## 2022-02-08 RX ADMIN — MORPHINE SULFATE 4 MILLIGRAM(S): 50 CAPSULE, EXTENDED RELEASE ORAL at 21:31

## 2022-02-08 RX ADMIN — ONDANSETRON 4 MILLIGRAM(S): 8 TABLET, FILM COATED ORAL at 15:09

## 2022-02-08 RX ADMIN — BUPROPION HYDROCHLORIDE 300 MILLIGRAM(S): 150 TABLET, EXTENDED RELEASE ORAL at 16:29

## 2022-02-08 RX ADMIN — MORPHINE SULFATE 4 MILLIGRAM(S): 50 CAPSULE, EXTENDED RELEASE ORAL at 04:58

## 2022-02-08 RX ADMIN — Medication 650 MILLIGRAM(S): at 22:18

## 2022-02-08 RX ADMIN — OXYCODONE HYDROCHLORIDE 5 MILLIGRAM(S): 5 TABLET ORAL at 00:27

## 2022-02-08 RX ADMIN — MORPHINE SULFATE 4 MILLIGRAM(S): 50 CAPSULE, EXTENDED RELEASE ORAL at 22:01

## 2022-02-08 RX ADMIN — AZITHROMYCIN 255 MILLIGRAM(S): 500 TABLET, FILM COATED ORAL at 19:53

## 2022-02-08 RX ADMIN — CEFTRIAXONE 100 MILLIGRAM(S): 500 INJECTION, POWDER, FOR SOLUTION INTRAMUSCULAR; INTRAVENOUS at 19:09

## 2022-02-08 NOTE — PROGRESS NOTE ADULT - SUBJECTIVE AND OBJECTIVE BOX
Patient is a 51y old  Female who presents with a chief complaint of 51F p/w sob, abdominal pain and chest pain (08 Feb 2022 14:40)      SUBJECTIVE / OVERNIGHT EVENTS:    Events noted.  CONSTITUTIONAL: No fever,  or fatigue  RESPIRATORY: No cough, wheezing,  No shortness of breath  CARDIOVASCULAR: No chest pain, palpitations, dizziness, or leg swelling  GASTROINTESTINAL: No abdominal or epigastric pain. No nausea, vomiting.      MEDICATIONS  (STANDING):  amphetamine/dextroamphetamine XR 20 milliGRAM(s) Oral with breakfast  azithromycin  IVPB 500 milliGRAM(s) IV Intermittent every 24 hours  buPROPion XL (24-Hour) . 300 milliGRAM(s) Oral daily  cefTRIAXone   IVPB 1000 milliGRAM(s) IV Intermittent every 24 hours  influenza   Vaccine 0.5 milliLiter(s) IntraMuscular once    MEDICATIONS  (PRN):  acetaminophen     Tablet .. 650 milliGRAM(s) Oral every 6 hours PRN Temp greater or equal to 38C (100.4F), Mild Pain (1 - 3)  morphine  - Injectable 4 milliGRAM(s) IV Push every 4 hours PRN Severe Pain (7 - 10)  ondansetron Injectable 4 milliGRAM(s) IV Push every 8 hours PRN Nausea and/or Vomiting  oxyCODONE    IR 5 milliGRAM(s) Oral every 4 hours PRN Moderate Pain (4 - 6)        CAPILLARY BLOOD GLUCOSE        I&O's Summary    08 Feb 2022 07:01  -  09 Feb 2022 00:34  --------------------------------------------------------  IN: 0 mL / OUT: 350 mL / NET: -350 mL        T(C): 36.9 (02-08-22 @ 22:11), Max: 37.9 (02-08-22 @ 20:56)  HR: 121 (02-08-22 @ 22:11) (104 - 129)  BP: 144/71 (02-08-22 @ 22:11) (102/68 - 144/71)  RR: 20 (02-08-22 @ 22:11) (20 - 20)  SpO2: 93% (02-08-22 @ 22:11) (93% - 96%)    PHYSICAL EXAM:    NECK: Supple, No JVD  CHEST/LUNG: Clear to auscultation bilaterally; No wheezing.  HEART: Regular rate and rhythm; No murmurs, rubs, or gallops  ABDOMEN: Soft, Nontender, Nondistended; Bowel sounds present  EXTREMITIES:   No edema  NEUROLOGY: AAO X 3      LABS:                        12.5   16.27 )-----------( 400      ( 08 Feb 2022 06:56 )             38.9     02-08    130<L>  |  96  |  21  ----------------------------<  164<H>  4.4   |  21<L>  |  0.90    Ca    8.8      08 Feb 2022 06:56  Phos  3.9     02-08  Mg     1.8     02-08    TPro  7.3  /  Alb  3.4  /  TBili  1.0  /  DBili  x   /  AST  10  /  ALT  11  /  AlkPhos  101  02-08    PT/INR - ( 08 Feb 2022 06:56 )   PT: 14.6 sec;   INR: 1.23 ratio         PTT - ( 08 Feb 2022 06:56 )  PTT:23.6 sec  CARDIAC MARKERS ( 08 Feb 2022 22:55 )  x     / x     / 32 U/L / x     / 1.0 ng/mL        CAPILLARY BLOOD GLUCOSE            RADIOLOGY & ADDITIONAL TESTS:    Imaging Personally Reviewed:    Consultant(s) Notes Reviewed:      Care Discussed with Consultants/Other Providers:    Ronak Mcdonnell MD, CMD, FACP    [address] [address]  Office Tel: [phone]  Cell: [phone]

## 2022-02-08 NOTE — PROGRESS NOTE ADULT - ASSESSMENT
51F w/ hx of gastric lap band by Dr. Aceevdo in 2010 who now presents with poor PO intake, nausea but no vomiting and epigastric abdominal pain.    Recommendations:  - Lap band deflation attempted by bariatric surgery team at bedside however unable to succeed  - Recommend IR consultation for deflation of lap band under fluoroscopy  - No further bariatric surgery interventions at this time    [facility], [phone]

## 2022-02-08 NOTE — PROVIDER CONTACT NOTE (OTHER) - ASSESSMENT
Pt complaining of pain, HR tachycardic. Pt not experiencing dizziness, altered vision. Mental status at baseline. All other VSS besides increased HR.

## 2022-02-08 NOTE — CHART NOTE - NSCHARTNOTEFT_GEN_A_CORE
Medicine PA Episodic Note    Patient is a 51y old  Female who presents with a chief complaint of 51F p/w sob, abdominal pain and chest pain (08 Feb 2022 14:40)      Vital Signs Last 24 Hrs  T(C): 36.9 (08 Feb 2022 22:11), Max: 37.9 (08 Feb 2022 20:56)  T(F): 98.5 (08 Feb 2022 22:11), Max: 100.2 (08 Feb 2022 20:56)  HR: 121 (08 Feb 2022 22:11) (104 - 129)  BP: 144/71 (08 Feb 2022 22:11) (102/68 - 144/71)  BP(mean): 78 (08 Feb 2022 00:17) (78 - 78)  RR: 20 (08 Feb 2022 22:11) (20 - 20)  SpO2: 93% (08 Feb 2022 22:11) (93% - 96%)      Labs:                          12.5   16.27 )-----------( 400      ( 08 Feb 2022 06:56 )             38.9     02-08    130<L>  |  96  |  21  ----------------------------<  164<H>  4.4   |  21<L>  |  0.90    Ca    8.8      08 Feb 2022 06:56  Phos  3.9     02-08  Mg     1.8     02-08    TPro  7.3  /  Alb  3.4  /  TBili  1.0  /  DBili  x   /  AST  10  /  ALT  11  /  AlkPhos  101  02-08        Radiology:    Physical Exam:  General: WN/WD NAD  Neurology: A&Ox3, nonfocal, PATEL x 4  Head:  Normocephalic, atraumatic  Respiratory: CTA B/L  CV: RRR, S1S2, no murmur  Abdominal: Soft, NT, ND no palpable mass  MSK: No edema, + peripheral pulses, FROM all 4 extremity    Assessment & Plan:  HPI:  51F w/ class III obesity s/p lapband (2010) p/w sob, abdominal pain and chest pain. The patient reports she developed on 2/3/22 abdominal discomfort in LUQ described as gas sensation similar to when she was postop after lapband which was intermittent and mild to moderate severity initially. She then noticed on 2/5/22 that the pain radiated to left chest wall and felt an intermittent chest tightness, restricted deep breath, 10/10 severe at times, and associated w/ sob. The discomfort and sob occurred at risk and progressed leading the patient to seek medical attention. On ROS, the patient does reports fever and chills during this time but denies cough, hemoptysis, vomiting, diarrhea, palpitations, or painful urination. The patient denies sick contacts, hx of hospitalization, cigarette smoking, family history of lung disease, weight loss, night sweats, or travel outside of [place]. The patient significantly has not followed with a primary care doctor and has not completed age appropriate screening with mammography, pap smear or colonoscopy. (07 Feb 2022 23:02)      Plan:      Endorse to AM team.   Follow up with Attending in AM.  Mahendra GRIGSBY  Dept of Medicine Medicine PA Episodic Note    Patient is a 51y old  Female who presents with a chief complaint of 51F p/w sob, abdominal pain and chest pain (08 Feb 2022 14:40)      Vital Signs Last 24 Hrs  T(C): 36.9 (08 Feb 2022 22:11), Max: 37.9 (08 Feb 2022 20:56)  T(F): 98.5 (08 Feb 2022 22:11), Max: 100.2 (08 Feb 2022 20:56)  HR: 121 (08 Feb 2022 22:11) (104 - 129)  BP: 144/71 (08 Feb 2022 22:11) (102/68 - 144/71)  BP(mean): 78 (08 Feb 2022 00:17) (78 - 78)  RR: 20 (08 Feb 2022 22:11) (20 - 20)  SpO2: 93% (08 Feb 2022 22:11) (93% - 96%)      Labs:                          12.5   16.27 )-----------( 400      ( 08 Feb 2022 06:56 )             38.9     02-08    130<L>  |  96  |  21  ----------------------------<  164<H>  4.4   |  21<L>  |  0.90    Ca    8.8      08 Feb 2022 06:56  Phos  3.9     02-08  Mg     1.8     02-08    TPro  7.3  /  Alb  3.4  /  TBili  1.0  /  DBili  x   /  AST  10  /  ALT  11  /  AlkPhos  101  02-08    CARDIAC MARKERS ( 08 Feb 2022 22:55 )  x     / x     / 32 U/L / x     / 1.0 ng/mL    Troponin T, High Sensitivity (02.08.22 @ 22:55)    Troponin T, High Sensitivity Result: 8    Radiology:    < from: Xray Chest 1 View- PORTABLE-Urgent (Xray Chest 1 View- PORTABLE-Urgent .) (02.08.22 @ 23:41) >    Large left pleural effusion with associated atelectasis.  Left chest tube in place without appreciable pneumothorax.    < end of copied text >      Physical Exam:  General: WN/WD NAD, obese  Neurology: A&Ox3, nonfocal, PATEL x 4  Head:  Normocephalic, atraumatic  Respiratory: Diminished left lung sounds. L-sided chest tube - c/d/i  CV: RRR, S1S2, no murmur  Abdominal: Soft, NT, ND no palpable mass  MSK: + peripheral pulses, FROM all 4 extremity    Assessment & Plan:  HPI:  51F w/ class III obesity s/p lapband (2010) p/w sob, abdominal pain and chest pain found to have left-sided loculated pleural effusion with sepsis    Plan:  #Left loculated pleural effusion s/p pigtail  - CXR with atelectasis, no pneumo; Incentive Spirometry ordered   - c/w with pigtail - CTSx following.   - 2L NC for comfort.   - C/w CTX + Azithromycin  - C/w pain management.     #tachycardia  - EKG Sinus Tachy 121, no acute changes from previous EKG.   - pt with low grade temp 100.2 --> tylenol given  - c/w pain management.   - s/p interventions HR improved to 90s      Endorse to AM team.   Follow up with Attending in AM.  Mahendra GRIGSBY  Dept of Medicine Medicine PA Episodic Note    Patient is a 51y old  Female who presents with a chief complaint of 51F p/w sob, abdominal pain and chest pain (08 Feb 2022 14:40)    Notified by RN that patient complaining of chest tightness starting from the left anterior chest wall to the right anterior chest. Endorses sharp pain upon inspiration. Pain is a 8/10 on a pain scale on deep breathing. Patient admitted with similar symptoms, Patient is A&OX4, mentating appropriately, speaking in full sentences on 2L NC. Patient is s/p pigtail for a left loculated pleural effusion connected to LWS.  Patient denies chest pain, sob, headache, palpitations, blurry vision, dizziness.     Vital Signs Last 24 Hrs  T(C): 36.9 (08 Feb 2022 22:11), Max: 37.9 (08 Feb 2022 20:56)  T(F): 98.5 (08 Feb 2022 22:11), Max: 100.2 (08 Feb 2022 20:56)  HR: 121 (08 Feb 2022 22:11) (104 - 129)  BP: 144/71 (08 Feb 2022 22:11) (102/68 - 144/71)  BP(mean): 78 (08 Feb 2022 00:17) (78 - 78)  RR: 20 (08 Feb 2022 22:11) (20 - 20)  SpO2: 93% (08 Feb 2022 22:11) (93% - 96%)      Labs:                          12.5   16.27 )-----------( 400      ( 08 Feb 2022 06:56 )             38.9     02-08    130<L>  |  96  |  21  ----------------------------<  164<H>  4.4   |  21<L>  |  0.90    Ca    8.8      08 Feb 2022 06:56  Phos  3.9     02-08  Mg     1.8     02-08    TPro  7.3  /  Alb  3.4  /  TBili  1.0  /  DBili  x   /  AST  10  /  ALT  11  /  AlkPhos  101  02-08    CARDIAC MARKERS ( 08 Feb 2022 22:55 )  x     / x     / 32 U/L / x     / 1.0 ng/mL    Troponin T, High Sensitivity (02.08.22 @ 22:55)    Troponin T, High Sensitivity Result: 8    Radiology:    < from: Xray Chest 1 View- PORTABLE-Urgent (Xray Chest 1 View- PORTABLE-Urgent .) (02.08.22 @ 23:41) >    Large left pleural effusion with associated atelectasis.  Left chest tube in place without appreciable pneumothorax.    < end of copied text >      Physical Exam:  General: WN/WD NAD, obese  Neurology: A&Ox3, nonfocal, PATEL x 4  Head:  Normocephalic, atraumatic  Respiratory: Diminished left lung sounds. L-sided chest tube - c/d/i  CV: RRR, S1S2, no murmur  Abdominal: Soft, NT, ND no palpable mass  MSK: + peripheral pulses, FROM all 4 extremity    Assessment & Plan:  HPI:  51F w/ class III obesity s/p lapband (2010) p/w sob, abdominal pain and chest pain found to have left-sided loculated pleural effusion with sepsis    Plan:  #Left loculated pleural effusion s/p pigtail  - CXR with atelectasis, no pneumo; Incentive Spirometry ordered   - c/w with pigtail - CTSx following.   - 2L NC for comfort.   - C/w CTX + Azithromycin  - C/w pain management.     #tachycardia  - EKG Sinus Tachy 121, no acute changes from previous EKG.   - pt with low grade temp 100.2 --> tylenol given  - c/w pain management.   - s/p interventions HR improved to 90s      Endorse to AM team.   Follow up with Attending in AM.  Mahendra GRIGSBY  Dept of Medicine

## 2022-02-08 NOTE — PATIENT PROFILE ADULT - FALL HARM RISK - HARM RISK INTERVENTIONS

## 2022-02-08 NOTE — PROGRESS NOTE ADULT - SUBJECTIVE AND OBJECTIVE BOX
SURGERY DAILY PROGRESS NOTE:       SUBJECTIVE/ROS: No acute overnight events. Patient seen and examined at bedside during morning rounds.    OBJECTIVE:    Vital Signs Last 24 Hrs  T(C): 36.4 (08 Feb 2022 00:17), Max: 36.8 (07 Feb 2022 12:40)  T(F): 97.6 (08 Feb 2022 00:17), Max: 98.3 (07 Feb 2022 12:40)  HR: 104 (08 Feb 2022 00:17) (102 - 116)  BP: 117/61 (08 Feb 2022 00:17) (117/61 - 153/85)  BP(mean): 78 (08 Feb 2022 00:17) (78 - 91)  RR: 20 (08 Feb 2022 00:17) (18 - 30)  SpO2: 99% (07 Feb 2022 19:40) (96% - 100%)    I&O's Detail    Physical Exam:  General: NAD, resting comfortably  Pulmonary: Breathing comfortably  Cardiovascular: NSR, no murmurs  Abdominal: soft, mild LUQ tenderness, no organomegaly  Extremities: WWP, normal strength    LABS:                        12.8   19.04 )-----------( 397      ( 07 Feb 2022 13:48 )             40.1     02-07    130<L>  |  94<L>  |  24<H>  ----------------------------<  184<H>  4.0   |  21<L>  |  1.11    Ca    9.2      07 Feb 2022 13:48    TPro  7.3  /  Alb  3.4  /  TBili  1.1  /  DBili  x   /  AST  8<L>  /  ALT  15  /  AlkPhos  99  02-07    PT/INR - ( 07 Feb 2022 13:48 )   PT: 16.0 sec;   INR: 1.35 ratio         PTT - ( 07 Feb 2022 13:48 )  PTT:30.2 sec

## 2022-02-08 NOTE — PROGRESS NOTE ADULT - PROBLEM SELECTOR PLAN 1
- CT surgery: S/p  chest tube  - c/w ceftriaxone and azithromycin  - c/w morphine for severe pain, oxycodone for moderate pain, acetaminophen for mild pain and fever

## 2022-02-08 NOTE — PROGRESS NOTE ADULT - SUBJECTIVE AND OBJECTIVE BOX
Patient is a 51y old  Female who presents with a chief complaint of 51F p/w sob, abdominal pain and chest pain (08 Feb 2022 02:19)      SUBJECTIVE: "Hi"    Vital Signs Last 24 Hrs  T(C): 36.8 (02-08-22 @ 14:02), Max: 37.2 (02-08-22 @ 09:47)  T(F): 98.2 (02-08-22 @ 14:02), Max: 99 (02-08-22 @ 09:47)  HR: 113 (02-08-22 @ 14:02) (102 - 113)  BP: 102/68 (02-08-22 @ 14:02) (102/68 - 150/110)  RR: 20 (02-08-22 @ 14:02) (18 - 24)  SpO2: 95% (02-08-22 @ 14:02) (95% - 100%)                02-08-22 @ 07:01  -  02-08-22 @ 14:40  --------------------------------------------------------  IN: 0 mL / OUT: 250 mL / NET: -250 mL        Daily     Daily                           12.5   16.27 )-----------( 400      ( 08 Feb 2022 06:56 )             38.9     02-08    130<L>  |  96  |  21  ----------------------------<  164<H>  4.4   |  21<L>  |  0.90    Ca    8.8      08 Feb 2022 06:56  Phos  3.9     02-08  Mg     1.8     02-08    TPro  7.3  /  Alb  3.4  /  TBili  1.0  /  DBili  x   /  AST  10  /  ALT  11  /  AlkPhos  101  02-08          PHYSICAL EXAM  Neurology: ANOx3, NAD   CV : RRR+S1S2  Lungs: Respirations non-labored, B/L BS  Abdomen: Soft, NT/ ND, +BSx4Q  Extremities: B/L LE edema, negative calf tenderness, +PP           CHEST TUBES:  Left CT     [ X ] LWS  [  ]H2O seal  Right CT   [  ] LWS  [  ]H2O seal        MEDICATIONS  acetaminophen     Tablet .. 650 milliGRAM(s) Oral every 6 hours PRN  amphetamine/dextroamphetamine XR 20 milliGRAM(s) Oral with breakfast  azithromycin  IVPB 500 milliGRAM(s) IV Intermittent every 24 hours  buPROPion XL (24-Hour) . 300 milliGRAM(s) Oral daily  cefTRIAXone   IVPB 1000 milliGRAM(s) IV Intermittent every 24 hours  morphine  - Injectable 4 milliGRAM(s) IV Push every 4 hours PRN  ondansetron Injectable 4 milliGRAM(s) IV Push every 8 hours PRN  oxyCODONE    IR 5 milliGRAM(s) Oral every 4 hours PRN

## 2022-02-08 NOTE — PROGRESS NOTE ADULT - ASSESSMENT
51F w/ PMHx of depression, obesity s/p lap band by Dr. Acevedo in 2010 who presents to the ED with a 4 day history of vague epigastric/LUQ pain that radiated to the left chest and scapula. Patient refers having poor PO intake, some nausea but no vomiting, passing gas and having BMs.     In the ED, patient was afebrile, tachycardic to 110s, normotensive, labs remarkable for leukocytosis of 19k, otherwise WNL. CTAP revealed oral contrast revealed adequate lap band placement, contrast in stomach, small bowel, and esophagus. Also, large left pleural effusion is partially appreciated. Thoracic surgery consulted for evaluation of pleural effusion.    2/8 VSS; patient is comfortable on 2L NC. L PTC - LWS in place

## 2022-02-08 NOTE — PROGRESS NOTE ADULT - PROBLEM SELECTOR PLAN 1
Maintain L PTC - LWS  Monitor drainage   Daily CXR   Pleural fluid with no growth   F/U Cytology   Plan d/w with Dr. Willett   Continue care as per primary team

## 2022-02-09 LAB
ANION GAP SERPL CALC-SCNC: 13 MMOL/L — SIGNIFICANT CHANGE UP (ref 5–17)
BUN SERPL-MCNC: 18 MG/DL — SIGNIFICANT CHANGE UP (ref 7–23)
CALCIUM SERPL-MCNC: 9.1 MG/DL — SIGNIFICANT CHANGE UP (ref 8.4–10.5)
CHLORIDE SERPL-SCNC: 95 MMOL/L — LOW (ref 96–108)
CO2 SERPL-SCNC: 23 MMOL/L — SIGNIFICANT CHANGE UP (ref 22–31)
CREAT SERPL-MCNC: 0.73 MG/DL — SIGNIFICANT CHANGE UP (ref 0.5–1.3)
GLUCOSE BLDC GLUCOMTR-MCNC: 132 MG/DL — HIGH (ref 70–99)
GLUCOSE SERPL-MCNC: 121 MG/DL — HIGH (ref 70–99)
HCT VFR BLD CALC: 37.8 % — SIGNIFICANT CHANGE UP (ref 34.5–45)
HGB BLD-MCNC: 11.9 G/DL — SIGNIFICANT CHANGE UP (ref 11.5–15.5)
MCHC RBC-ENTMCNC: 27 PG — SIGNIFICANT CHANGE UP (ref 27–34)
MCHC RBC-ENTMCNC: 31.5 GM/DL — LOW (ref 32–36)
MCV RBC AUTO: 85.7 FL — SIGNIFICANT CHANGE UP (ref 80–100)
NRBC # BLD: 0 /100 WBCS — SIGNIFICANT CHANGE UP (ref 0–0)
PLATELET # BLD AUTO: 382 K/UL — SIGNIFICANT CHANGE UP (ref 150–400)
POTASSIUM SERPL-MCNC: 4.5 MMOL/L — SIGNIFICANT CHANGE UP (ref 3.5–5.3)
POTASSIUM SERPL-SCNC: 4.5 MMOL/L — SIGNIFICANT CHANGE UP (ref 3.5–5.3)
RBC # BLD: 4.41 M/UL — SIGNIFICANT CHANGE UP (ref 3.8–5.2)
RBC # FLD: 13.3 % — SIGNIFICANT CHANGE UP (ref 10.3–14.5)
SODIUM SERPL-SCNC: 131 MMOL/L — LOW (ref 135–145)
WBC # BLD: 12.46 K/UL — HIGH (ref 3.8–10.5)
WBC # FLD AUTO: 12.46 K/UL — HIGH (ref 3.8–10.5)

## 2022-02-09 PROCEDURE — 99232 SBSQ HOSP IP/OBS MODERATE 35: CPT | Mod: 57

## 2022-02-09 PROCEDURE — 99232 SBSQ HOSP IP/OBS MODERATE 35: CPT

## 2022-02-09 PROCEDURE — 71045 X-RAY EXAM CHEST 1 VIEW: CPT | Mod: 26

## 2022-02-09 RX ORDER — ALTEPLASE 100 MG
10 KIT INTRAVENOUS EVERY 12 HOURS
Refills: 0 | Status: DISCONTINUED | OUTPATIENT
Start: 2022-02-09 | End: 2022-02-11

## 2022-02-09 RX ORDER — DORNASE ALFA 1 MG/ML
5 SOLUTION RESPIRATORY (INHALATION) EVERY 12 HOURS
Refills: 0 | Status: DISCONTINUED | OUTPATIENT
Start: 2022-02-09 | End: 2022-02-11

## 2022-02-09 RX ADMIN — MORPHINE SULFATE 4 MILLIGRAM(S): 50 CAPSULE, EXTENDED RELEASE ORAL at 17:52

## 2022-02-09 RX ADMIN — BUPROPION HYDROCHLORIDE 300 MILLIGRAM(S): 150 TABLET, EXTENDED RELEASE ORAL at 12:38

## 2022-02-09 RX ADMIN — OXYCODONE HYDROCHLORIDE 5 MILLIGRAM(S): 5 TABLET ORAL at 06:14

## 2022-02-09 RX ADMIN — OXYCODONE HYDROCHLORIDE 5 MILLIGRAM(S): 5 TABLET ORAL at 14:52

## 2022-02-09 RX ADMIN — AZITHROMYCIN 255 MILLIGRAM(S): 500 TABLET, FILM COATED ORAL at 18:31

## 2022-02-09 RX ADMIN — OXYCODONE HYDROCHLORIDE 5 MILLIGRAM(S): 5 TABLET ORAL at 05:44

## 2022-02-09 RX ADMIN — OXYCODONE HYDROCHLORIDE 5 MILLIGRAM(S): 5 TABLET ORAL at 17:10

## 2022-02-09 RX ADMIN — ONDANSETRON 4 MILLIGRAM(S): 8 TABLET, FILM COATED ORAL at 23:19

## 2022-02-09 RX ADMIN — CEFTRIAXONE 100 MILLIGRAM(S): 500 INJECTION, POWDER, FOR SOLUTION INTRAMUSCULAR; INTRAVENOUS at 17:57

## 2022-02-09 NOTE — PROGRESS NOTE ADULT - SUBJECTIVE AND OBJECTIVE BOX
Subjective:   Patient seen at bedside this AM. Reports feeling well, without complaints. Denies chest pain, SOB. Tolerating diet without N/V.     24h Events:   - Overnight, no acute events    Objective:  Vital Signs  T(C): 36.7 (02-09 @ 01:23), Max: 37.9 (02-08 @ 20:56)  HR: 94 (02-09 @ 01:23) (94 - 129)  BP: 111/79 (02-09 @ 01:23) (102/68 - 144/71)  RR: 20 (02-09 @ 01:23) (20 - 20)  SpO2: 94% (02-09 @ 01:23) (93% - 96%)      Physical Exam:  General: NAD, resting comfortably  Pulmonary: Breathing comfortably  Cardiovascular: NSR, no murmurs  Abdominal: soft, mild LUQ tenderness, no organomegaly  Extremities: WWP, normal strength    Labs:                        12.5   16.27 )-----------( 400      ( 08 Feb 2022 06:56 )             38.9   02-08    130<L>  |  96  |  21  ----------------------------<  164<H>  4.4   |  21<L>  |  0.90    Ca    8.8      08 Feb 2022 06:56  Phos  3.9     02-08  Mg     1.8     02-08    TPro  7.3  /  Alb  3.4  /  TBili  1.0  /  DBili  x   /  AST  10  /  ALT  11  /  AlkPhos  101  02-08    CAPILLARY BLOOD GLUCOSE          Medications:   MEDICATIONS  (STANDING):  amphetamine/dextroamphetamine XR 20 milliGRAM(s) Oral with breakfast  azithromycin  IVPB 500 milliGRAM(s) IV Intermittent every 24 hours  buPROPion XL (24-Hour) . 300 milliGRAM(s) Oral daily  cefTRIAXone   IVPB 1000 milliGRAM(s) IV Intermittent every 24 hours  influenza   Vaccine 0.5 milliLiter(s) IntraMuscular once    MEDICATIONS  (PRN):  acetaminophen     Tablet .. 650 milliGRAM(s) Oral every 6 hours PRN Temp greater or equal to 38C (100.4F), Mild Pain (1 - 3)  morphine  - Injectable 4 milliGRAM(s) IV Push every 4 hours PRN Severe Pain (7 - 10)  ondansetron Injectable 4 milliGRAM(s) IV Push every 8 hours PRN Nausea and/or Vomiting  oxyCODONE    IR 5 milliGRAM(s) Oral every 4 hours PRN Moderate Pain (4 - 6)       Subjective:   Patient seen at bedside this AM. Reports feeling well, without complaints. Denies chest pain, SOB. Tolerating diet with minimal nausea, no vomiting.    24h Events:   - Overnight, no acute events    Objective:  Vital Signs  T(C): 36.7 (02-09 @ 01:23), Max: 37.9 (02-08 @ 20:56)  HR: 94 (02-09 @ 01:23) (94 - 129)  BP: 111/79 (02-09 @ 01:23) (102/68 - 144/71)  RR: 20 (02-09 @ 01:23) (20 - 20)  SpO2: 94% (02-09 @ 01:23) (93% - 96%)      Physical Exam:  General: NAD, resting comfortably  Pulmonary: Breathing comfortably  Cardiovascular: NSR, no murmurs  Abdominal: soft, nontender  Extremities: WWP, normal strength    Labs:                        12.5   16.27 )-----------( 400      ( 08 Feb 2022 06:56 )             38.9   02-08    130<L>  |  96  |  21  ----------------------------<  164<H>  4.4   |  21<L>  |  0.90    Ca    8.8      08 Feb 2022 06:56  Phos  3.9     02-08  Mg     1.8     02-08    TPro  7.3  /  Alb  3.4  /  TBili  1.0  /  DBili  x   /  AST  10  /  ALT  11  /  AlkPhos  101  02-08    CAPILLARY BLOOD GLUCOSE          Medications:   MEDICATIONS  (STANDING):  amphetamine/dextroamphetamine XR 20 milliGRAM(s) Oral with breakfast  azithromycin  IVPB 500 milliGRAM(s) IV Intermittent every 24 hours  buPROPion XL (24-Hour) . 300 milliGRAM(s) Oral daily  cefTRIAXone   IVPB 1000 milliGRAM(s) IV Intermittent every 24 hours  influenza   Vaccine 0.5 milliLiter(s) IntraMuscular once    MEDICATIONS  (PRN):  acetaminophen     Tablet .. 650 milliGRAM(s) Oral every 6 hours PRN Temp greater or equal to 38C (100.4F), Mild Pain (1 - 3)  morphine  - Injectable 4 milliGRAM(s) IV Push every 4 hours PRN Severe Pain (7 - 10)  ondansetron Injectable 4 milliGRAM(s) IV Push every 8 hours PRN Nausea and/or Vomiting  oxyCODONE    IR 5 milliGRAM(s) Oral every 4 hours PRN Moderate Pain (4 - 6)

## 2022-02-09 NOTE — PROGRESS NOTE ADULT - SUBJECTIVE AND OBJECTIVE BOX
Patient is a 51y old  Female who presents with a chief complaint of 51F p/w sob, abdominal pain and chest pain (09 Feb 2022 01:29)      Vital Signs Last 24 Hrs  T(C): 36.9 (02-09-22 @ 05:27), Max: 37.9 (02-08-22 @ 20:56)  T(F): 98.5 (02-09-22 @ 05:27), Max: 100.2 (02-08-22 @ 20:56)  HR: 96 (02-09-22 @ 05:27) (94 - 129)  BP: 138/76 (02-09-22 @ 05:27) (102/68 - 144/71)  RR: 20 (02-09-22 @ 05:27) (20 - 20)  SpO2: 96% (02-09-22 @ 05:27) (93% - 96%)              02-08-22 @ 07:01  -  02-09-22 @ 07:00  --------------------------------------------------------  IN: 400 mL / OUT: 750 mL / NET: -350 mL                          11.9   12.46 )-----------( 382      ( 09 Feb 2022 07:14 )             37.8     131<L>  |  95<L>  |  18  ----------------------------<  121<H>  4.5   |  23  |  0.73            PHYSICAL EXAM  Neurology: A&Ox3, NAD  CV : RRR+S1S2  Lungs: Respirations non-labored, B/L BS clear, diminished at bases  +Left pigtail to LWS with serosanguinous drainage, no air leak  Abdomen: Soft, NT/ND, +BSx4Q  Extremities: B/L LE warm, no edema, +PP             MEDICATIONS  acetaminophen     Tablet .. 650 milliGRAM(s) Oral every 6 hours PRN  alteplase  Injectable for Pleural Effusion 10 milliGRAM(s) IntraPleural. every 12 hours  amphetamine/dextroamphetamine XR 20 milliGRAM(s) Oral with breakfast  azithromycin  IVPB 500 milliGRAM(s) IV Intermittent every 24 hours  buPROPion XL (24-Hour) . 300 milliGRAM(s) Oral daily  cefTRIAXone   IVPB 1000 milliGRAM(s) IV Intermittent every 24 hours  dornase debbi Solution for Pleural Effusion 5 milliGRAM(s) IntraPleural. every 12 hours  influenza   Vaccine 0.5 milliLiter(s) IntraMuscular once  morphine  - Injectable 4 milliGRAM(s) IV Push every 4 hours PRN  ondansetron Injectable 4 milliGRAM(s) IV Push every 8 hours PRN  oxyCODONE    IR 5 milliGRAM(s) Oral every 4 hours PRN

## 2022-02-09 NOTE — PROGRESS NOTE ADULT - PROBLEM SELECTOR PLAN 1
Maintain L PTC - LWS  Continue intrapleural TPA BID x 6 doses, 1st dose 2/9  Strict I & Os, document drainage in flowsheets   Daily CXR   Pleural fluid with no growth   F/U Cytology   Plan d/w with Dr. Willett   Continue care as per primary team

## 2022-02-09 NOTE — PROGRESS NOTE ADULT - ASSESSMENT
51F w/ PMHx of depression, obesity s/p lap band by Dr. Acevedo in 2010 who presents to the ED with a 4 day history of vague epigastric/LUQ pain that radiated to the left chest and scapula. Patient refers having poor PO intake, some nausea but no vomiting, passing gas and having BMs.     In the ED, patient was afebrile, tachycardic to 110s, normotensive, labs remarkable for leukocytosis of 19k, otherwise WNL. CTAP revealed oral contrast revealed adequate lap band placement, contrast in stomach, small bowel, and esophagus. Also, large left pleural effusion is partially appreciated. Thoracic surgery consulted for evaluation of pleural effusion.    2/8 VSS; patient is comfortable on 2L NC. L PTC - LWS in place   2/9 VSS, plan for intrapleural TPA via left pigtail today, hold anticoagulation.

## 2022-02-09 NOTE — PROGRESS NOTE ADULT - SUBJECTIVE AND OBJECTIVE BOX
Patient is a 51y old  Female who presents with a chief complaint of 51F p/w sob, abdominal pain and chest pain (09 Feb 2022 09:32)      SUBJECTIVE / OVERNIGHT EVENTS:    MEDICATIONS  (STANDING):  alteplase  Injectable for Pleural Effusion 10 milliGRAM(s) IntraPleural. every 12 hours  amphetamine/dextroamphetamine XR 20 milliGRAM(s) Oral with breakfast  azithromycin  IVPB 500 milliGRAM(s) IV Intermittent every 24 hours  buPROPion XL (24-Hour) . 300 milliGRAM(s) Oral daily  cefTRIAXone   IVPB 1000 milliGRAM(s) IV Intermittent every 24 hours  dornase debbi Solution for Pleural Effusion 5 milliGRAM(s) IntraPleural. every 12 hours  influenza   Vaccine 0.5 milliLiter(s) IntraMuscular once    MEDICATIONS  (PRN):  acetaminophen     Tablet .. 650 milliGRAM(s) Oral every 6 hours PRN Temp greater or equal to 38C (100.4F), Mild Pain (1 - 3)  morphine  - Injectable 4 milliGRAM(s) IV Push every 4 hours PRN Severe Pain (7 - 10)  ondansetron Injectable 4 milliGRAM(s) IV Push every 8 hours PRN Nausea and/or Vomiting  oxyCODONE    IR 5 milliGRAM(s) Oral every 4 hours PRN Moderate Pain (4 - 6)      Vital Signs Last 24 Hrs  T(F): 99.6 (02-09-22 @ 16:54), Max: 100.2 (02-08-22 @ 20:56)  HR: 114 (02-09-22 @ 16:54) (94 - 129)  BP: 118/60 (02-09-22 @ 16:54) (108/64 - 144/71)  RR: 20 (02-09-22 @ 16:54) (19 - 20)  SpO2: 94% (02-09-22 @ 16:54) (93% - 98%)  Telemetry:   CAPILLARY BLOOD GLUCOSE      POCT Blood Glucose.: 132 mg/dL (09 Feb 2022 09:43)    I&O's Summary    08 Feb 2022 07:01  -  09 Feb 2022 07:00  --------------------------------------------------------  IN: 400 mL / OUT: 750 mL / NET: -350 mL    09 Feb 2022 07:01  -  09 Feb 2022 19:40  --------------------------------------------------------  IN: 720 mL / OUT: 700 mL / NET: 20 mL        PHYSICAL EXAM:  GENERAL: NAD, well-developed  HEAD:  Atraumatic, Normocephalic  EYES: EOMI, PERRLA, conjunctiva and sclera clear  NECK: Supple, No JVD  CHEST/LUNG: Clear to auscultation bilaterally; No wheeze  HEART: Regular rate and rhythm; No murmurs, rubs, or gallops  ABDOMEN: Soft, Nontender, Nondistended; Bowel sounds present  EXTREMITIES:  2+ Peripheral Pulses, No clubbing, cyanosis, or edema  PSYCH: AAOx3  NEUROLOGY: non-focal  SKIN: No rashes or lesions    LABS:                        11.9   12.46 )-----------( 382      ( 09 Feb 2022 07:14 )             37.8     02-09    131<L>  |  95<L>  |  18  ----------------------------<  121<H>  4.5   |  23  |  0.73    Ca    9.1      09 Feb 2022 07:17  Phos  3.9     02-08  Mg     1.8     02-08    TPro  7.3  /  Alb  3.4  /  TBili  1.0  /  DBili  x   /  AST  10  /  ALT  11  /  AlkPhos  101  02-08    PT/INR - ( 08 Feb 2022 06:56 )   PT: 14.6 sec;   INR: 1.23 ratio         PTT - ( 08 Feb 2022 06:56 )  PTT:23.6 sec  CARDIAC MARKERS ( 08 Feb 2022 22:55 )  x     / x     / 32 U/L / x     / 1.0 ng/mL          RADIOLOGY & ADDITIONAL TESTS:    Imaging Personally Reviewed:    Consultant(s) Notes Reviewed:      Care Discussed with Consultants/Other Providers:

## 2022-02-09 NOTE — PROGRESS NOTE ADULT - ASSESSMENT
51F w/ hx of gastric lap band by Dr. Acevedo in 2010 who now presents with poor PO intake, nausea but no vomiting and epigastric abdominal pain.    Recommendations:  - Lap band deflation attempted by bariatric surgery team at bedside however unable to succeed  - Recommend IR consultation for deflation of lap band under fluoroscopy  - No further bariatric surgery interventions at this time    [facility], [phone] 51F w/ hx of gastric lap band by Dr. Acevedo in 2010 who now presents with poor PO intake, nausea but no vomiting and epigastric abdominal pain.  Band deflated yesterday with resolution of symptoms.    Recommendations:  Diet as tolerated.  f/u pulm chemistries and care per med service.  may need band out at some point.  - No further bariatric surgery interventions at this time    [facility], [phone] 51F w/ hx of gastric lap band by Dr. Acevedo in 2010 who now presents with poor PO intake, nausea but no vomiting and epigastric abdominal pain. Band deflated 2/8 with improvement in GI symptoms.    Recommendations:  - Diet as tolerated.  - f/u pulm chemistries and care per med service.  - May need band removed, but not surgical candidate at this time  - No further bariatric surgery interventions at this time    [facility], [phone]

## 2022-02-10 ENCOUNTER — TRANSCRIPTION ENCOUNTER (OUTPATIENT)
Age: 52
End: 2022-02-10

## 2022-02-10 PROBLEM — Z00.00 ENCOUNTER FOR PREVENTIVE HEALTH EXAMINATION: Noted: 2022-02-10

## 2022-02-10 LAB
ANION GAP SERPL CALC-SCNC: 15 MMOL/L — SIGNIFICANT CHANGE UP (ref 5–17)
APTT BLD: 31.6 SEC — SIGNIFICANT CHANGE UP (ref 27.5–35.5)
BUN SERPL-MCNC: 19 MG/DL — SIGNIFICANT CHANGE UP (ref 7–23)
CALCIUM SERPL-MCNC: 9 MG/DL — SIGNIFICANT CHANGE UP (ref 8.4–10.5)
CHLORIDE SERPL-SCNC: 92 MMOL/L — LOW (ref 96–108)
CO2 SERPL-SCNC: 22 MMOL/L — SIGNIFICANT CHANGE UP (ref 22–31)
CREAT SERPL-MCNC: 0.76 MG/DL — SIGNIFICANT CHANGE UP (ref 0.5–1.3)
GLUCOSE SERPL-MCNC: 177 MG/DL — HIGH (ref 70–99)
HCG UR QL: NEGATIVE — SIGNIFICANT CHANGE UP
HCT VFR BLD CALC: 39.1 % — SIGNIFICANT CHANGE UP (ref 34.5–45)
HGB BLD-MCNC: 13.1 G/DL — SIGNIFICANT CHANGE UP (ref 11.5–15.5)
INR BLD: 1.21 RATIO — HIGH (ref 0.88–1.16)
MCHC RBC-ENTMCNC: 27.7 PG — SIGNIFICANT CHANGE UP (ref 27–34)
MCHC RBC-ENTMCNC: 33.5 GM/DL — SIGNIFICANT CHANGE UP (ref 32–36)
MCV RBC AUTO: 82.7 FL — SIGNIFICANT CHANGE UP (ref 80–100)
NRBC # BLD: 0 /100 WBCS — SIGNIFICANT CHANGE UP (ref 0–0)
PLATELET # BLD AUTO: 495 K/UL — HIGH (ref 150–400)
POTASSIUM SERPL-MCNC: 4.6 MMOL/L — SIGNIFICANT CHANGE UP (ref 3.5–5.3)
POTASSIUM SERPL-SCNC: 4.6 MMOL/L — SIGNIFICANT CHANGE UP (ref 3.5–5.3)
PROTHROM AB SERPL-ACNC: 14.4 SEC — HIGH (ref 10.6–13.6)
RBC # BLD: 4.73 M/UL — SIGNIFICANT CHANGE UP (ref 3.8–5.2)
RBC # FLD: 13.2 % — SIGNIFICANT CHANGE UP (ref 10.3–14.5)
SARS-COV-2 RNA SPEC QL NAA+PROBE: SIGNIFICANT CHANGE UP
SODIUM SERPL-SCNC: 129 MMOL/L — LOW (ref 135–145)
WBC # BLD: 15.99 K/UL — HIGH (ref 3.8–10.5)
WBC # FLD AUTO: 15.99 K/UL — HIGH (ref 3.8–10.5)

## 2022-02-10 PROCEDURE — 71045 X-RAY EXAM CHEST 1 VIEW: CPT | Mod: 26

## 2022-02-10 PROCEDURE — 99232 SBSQ HOSP IP/OBS MODERATE 35: CPT

## 2022-02-10 RX ADMIN — OXYCODONE HYDROCHLORIDE 5 MILLIGRAM(S): 5 TABLET ORAL at 10:51

## 2022-02-10 RX ADMIN — OXYCODONE HYDROCHLORIDE 5 MILLIGRAM(S): 5 TABLET ORAL at 15:13

## 2022-02-10 RX ADMIN — OXYCODONE HYDROCHLORIDE 5 MILLIGRAM(S): 5 TABLET ORAL at 19:24

## 2022-02-10 RX ADMIN — OXYCODONE HYDROCHLORIDE 5 MILLIGRAM(S): 5 TABLET ORAL at 19:54

## 2022-02-10 RX ADMIN — AZITHROMYCIN 255 MILLIGRAM(S): 500 TABLET, FILM COATED ORAL at 17:28

## 2022-02-10 RX ADMIN — BUPROPION HYDROCHLORIDE 300 MILLIGRAM(S): 150 TABLET, EXTENDED RELEASE ORAL at 12:18

## 2022-02-10 RX ADMIN — OXYCODONE HYDROCHLORIDE 5 MILLIGRAM(S): 5 TABLET ORAL at 14:43

## 2022-02-10 RX ADMIN — OXYCODONE HYDROCHLORIDE 5 MILLIGRAM(S): 5 TABLET ORAL at 03:26

## 2022-02-10 RX ADMIN — OXYCODONE HYDROCHLORIDE 5 MILLIGRAM(S): 5 TABLET ORAL at 10:21

## 2022-02-10 RX ADMIN — CEFTRIAXONE 100 MILLIGRAM(S): 500 INJECTION, POWDER, FOR SOLUTION INTRAMUSCULAR; INTRAVENOUS at 16:56

## 2022-02-10 NOTE — PROGRESS NOTE ADULT - ASSESSMENT
Stress test changed to lexiscan    1F w/ hx of gastric lap band by Dr. Acevedo in 2010 who now presents with poor PO intake, nausea but no vomiting and epigastric abdominal pain. Band deflated 2/8 with improvement in GI symptoms.    Recommendations:  - Diet as tolerated.  - f/u pulm chemistries and care per med service.  - May need band removed, but not surgical candidate at this time  - No further bariatric surgery interventions at this time  - Care per primary team    [facility], [phone]

## 2022-02-10 NOTE — PROGRESS NOTE ADULT - ASSESSMENT
51F w/ PMHx of depression, obesity s/p lap band by Dr. Acevedo in 2010 who presents to the ED with a 4 day history of vague epigastric/LUQ pain that radiated to the left chest and scapula. Patient refers having poor PO intake, some nausea but no vomiting, passing gas and having BMs.     In the ED, patient was afebrile, tachycardic to 110s, normotensive, labs remarkable for leukocytosis of 19k, otherwise WNL. CTAP revealed oral contrast revealed adequate lap band placement, contrast in stomach, small bowel, and esophagus. Also, large left pleural effusion is partially appreciated. Thoracic surgery consulted for evaluation of pleural effusion.    2/8 VSS; patient is comfortable on 2L NC. L PTC - LWS in place   2/9 VSS, plan for intrapleural TPA via left pigtail today, hold anticoagulation.  2/10 VSS, continue intrapleural TPA, keep NPO p MN for possible VATS decort Friday.

## 2022-02-10 NOTE — PROGRESS NOTE ADULT - SUBJECTIVE AND OBJECTIVE BOX
Patient is a 51y old  Female who presents with a chief complaint of 51F p/w sob, abdominal pain and chest pain (10 Feb 2022 09:16)      SUBJECTIVE / OVERNIGHT EVENTS:    Events noted.  CONSTITUTIONAL: No fever,  or fatigue  RESPIRATORY: No cough, wheezing,  No shortness of breath  CARDIOVASCULAR: No chest pain, palpitations, dizziness, or leg swelling  GASTROINTESTINAL: No abdominal or epigastric pain. No nausea, vomiting.  NEUROLOGICAL: No headache    MEDICATIONS  (STANDING):  alteplase  Injectable for Pleural Effusion 10 milliGRAM(s) IntraPleural. every 12 hours  amphetamine/dextroamphetamine XR 20 milliGRAM(s) Oral with breakfast  azithromycin  IVPB 500 milliGRAM(s) IV Intermittent every 24 hours  buPROPion XL (24-Hour) . 300 milliGRAM(s) Oral daily  cefTRIAXone   IVPB 1000 milliGRAM(s) IV Intermittent every 24 hours  dornase debbi Solution for Pleural Effusion 5 milliGRAM(s) IntraPleural. every 12 hours  influenza   Vaccine 0.5 milliLiter(s) IntraMuscular once    MEDICATIONS  (PRN):  acetaminophen     Tablet .. 650 milliGRAM(s) Oral every 6 hours PRN Temp greater or equal to 38C (100.4F), Mild Pain (1 - 3)  morphine  - Injectable 4 milliGRAM(s) IV Push every 4 hours PRN Severe Pain (7 - 10)  ondansetron Injectable 4 milliGRAM(s) IV Push every 8 hours PRN Nausea and/or Vomiting  oxyCODONE    IR 5 milliGRAM(s) Oral every 4 hours PRN Moderate Pain (4 - 6)        CAPILLARY BLOOD GLUCOSE        I&O's Summary    09 Feb 2022 07:01  -  10 Feb 2022 07:00  --------------------------------------------------------  IN: 960 mL / OUT: 1050 mL / NET: -90 mL    10 Feb 2022 07:01  -  10 Feb 2022 23:02  --------------------------------------------------------  IN: 960 mL / OUT: 1200 mL / NET: -240 mL        T(C): 37 (02-10-22 @ 21:44), Max: 37.4 (02-10-22 @ 00:45)  HR: 110 (02-10-22 @ 21:44) (105 - 115)  BP: 133/80 (02-10-22 @ 21:44) (106/62 - 133/80)  RR: 18 (02-10-22 @ 21:44) (18 - 20)  SpO2: 92% (02-10-22 @ 21:44) (92% - 96%)    PHYSICAL EXAM:  GENERAL: NAD  NECK: Supple, No JVD  CHEST/LUNG: Clear to auscultation bilaterally; No wheezing.  HEART: Regular rate and rhythm; No murmurs, rubs, or gallops  ABDOMEN: Soft, Nontender, Nondistended; Bowel sounds present  EXTREMITIES:   No edema  NEUROLOGY: AAO X 3      LABS:                        13.1   15.99 )-----------( 495      ( 10 Feb 2022 06:55 )             39.1     02-10    129<L>  |  92<L>  |  19  ----------------------------<  177<H>  4.6   |  22  |  0.76    Ca    9.0      10 Feb 2022 06:55      PT/INR - ( 10 Feb 2022 06:55 )   PT: 14.4 sec;   INR: 1.21 ratio         PTT - ( 10 Feb 2022 06:55 )  PTT:31.6 sec        CAPILLARY BLOOD GLUCOSE            RADIOLOGY & ADDITIONAL TESTS:    Imaging Personally Reviewed:    Consultant(s) Notes Reviewed:      Care Discussed with Consultants/Other Providers:    Ronak Mcdonnell MD, CMD, FACP    [address] [place] [address]  Office Tel: [phone]  Cell: [phone]

## 2022-02-10 NOTE — PROGRESS NOTE ADULT - ASSESSMENT
51F w/ class III obesity s/p miguel (2010) p/w sob, abdominal pain and chest pain found to have left-sided loculated pleural effusion with sepsis    Loculated pleural effusion:    S/p CT  For VATs   Thoracic Sx f/up appreciated    Considering pt's medical condition and nature of sx, pt is at intermediate risk of the surgery.

## 2022-02-10 NOTE — PROGRESS NOTE ADULT - SUBJECTIVE AND OBJECTIVE BOX
Patient is a 51y old  Female who presents with a chief complaint of 51F p/w sob, abdominal pain and chest pain (10 Feb 2022 05:28)      Vital Signs Last 24 Hrs  T(C): 37.1 (02-10-22 @ 08:49), Max: 37.6 (02-09-22 @ 16:54)  T(F): 98.7 (02-10-22 @ 08:49), Max: 99.6 (02-09-22 @ 16:54)  HR: 109 (02-10-22 @ 08:49) (106 - 115)  BP: 111/70 (02-10-22 @ 08:49) (106/62 - 144/79)  RR: 20 (02-10-22 @ 08:49) (19 - 20)  SpO2: 94% (02-10-22 @ 08:49) (94% - 98%)              02-09-22 @ 07:01  -  02-10-22 @ 07:00  --------------------------------------------------------  IN: 960 mL / OUT: 1050 mL / NET: -90 mL                              13.1   15.99 )-----------( 495      ( 10 Feb 2022 06:55 )             39.1     129<L>  |  92<L>  |  19  ----------------------------<  177<H>  4.6   |  22  |  0.76            PHYSICAL EXAM  Neurology: A&Ox3, NAD  CV : RRR+S1S2  Lungs: Respirations non-labored, B/L BS clear, diminished at bases  +Left pigtail to LWS with serosanguinous drainage, no air leak  Abdomen: Soft, NT/ND, +BSx4Q  Extremities: B/L LE warm, no edema, +PP      MEDICATIONS  acetaminophen     Tablet .. 650 milliGRAM(s) Oral every 6 hours PRN  alteplase  Injectable for Pleural Effusion 10 milliGRAM(s) IntraPleural. every 12 hours  amphetamine/dextroamphetamine XR 20 milliGRAM(s) Oral with breakfast  azithromycin  IVPB 500 milliGRAM(s) IV Intermittent every 24 hours  buPROPion XL (24-Hour) . 300 milliGRAM(s) Oral daily  cefTRIAXone   IVPB 1000 milliGRAM(s) IV Intermittent every 24 hours  dornase debbi Solution for Pleural Effusion 5 milliGRAM(s) IntraPleural. every 12 hours  influenza   Vaccine 0.5 milliLiter(s) IntraMuscular once  morphine  - Injectable 4 milliGRAM(s) IV Push every 4 hours PRN  ondansetron Injectable 4 milliGRAM(s) IV Push every 8 hours PRN  oxyCODONE    IR 5 milliGRAM(s) Oral every 4 hours PRN

## 2022-02-10 NOTE — PROGRESS NOTE ADULT - SUBJECTIVE AND OBJECTIVE BOX
Subjective:   Patient seen at bedside this AM. Reports feeling well, without complaints. Denies chest pain, SOB. Improved nausea since lab band deflated.    24h Events:   - Overnight, no acute events       OBJECTIVE:  Vital Signs:  Afebrile, HDS  Vital Signs Last 24 Hrs  T(C): 37.4 (10 Feb 2022 00:45), Max: 37.6 (09 Feb 2022 16:54)  T(F): 99.3 (10 Feb 2022 00:45), Max: 99.6 (09 Feb 2022 16:54)  HR: 115 (10 Feb 2022 00:45) (106 - 115)  BP: 106/62 (10 Feb 2022 00:45) (106/62 - 144/79)  BP(mean): --  RR: 20 (10 Feb 2022 00:45) (19 - 20)  SpO2: 96% (10 Feb 2022 00:45) (94% - 98%)                        11.9   12.46 )-----------( 382      ( 09 Feb 2022 07:14 )             37.8     02-09    131<L>  |  95<L>  |  18  ----------------------------<  121<H>  4.5   |  23  |  0.73    Ca    9.1      09 Feb 2022 07:17  Phos  3.9     02-08  Mg     1.8     02-08    TPro  7.3  /  Alb  3.4  /  TBili  1.0  /  DBili  x   /  AST  10  /  ALT  11  /  AlkPhos  101  02-08   PT/INR - ( 08 Feb 2022 06:56 )   PT: 14.6 sec;   INR: 1.23 ratio         PTT - ( 08 Feb 2022 06:56 )  PTT:23.6 sec  I&O's Detail    08 Feb 2022 07:01  -  09 Feb 2022 07:00  --------------------------------------------------------  IN:    Oral Fluid: 400 mL  Total IN: 400 mL    OUT:    Chest Tube (mL): 350 mL    Voided (mL): 400 mL  Total OUT: 750 mL    Total NET: -350 mL      09 Feb 2022 07:01  -  10 Feb 2022 05:29  --------------------------------------------------------  IN:    Oral Fluid: 960 mL  Total IN: 960 mL    OUT:    Voided (mL): 850 mL  Total OUT: 850 mL    Total NET: 110 mL        PHYSICAL EXAM:  General: NAD, resting comfortably  Pulmonary: Breathing comfortably, chest tube in place  Cardiovascular: NSR, no murmurs  Abdominal: soft, nontender  Extremities: WWP     Subjective:   Patient seen at bedside this AM. Reports feeling well, without complaints. Denies chest pain, SOB. Improved nausea since lab band deflated.    24h Events:   - Overnight, no acute events     OBJECTIVE:  Vital Signs:  Afebrile, HDS  Vital Signs Last 24 Hrs  T(C): 37.4 (10 Feb 2022 00:45), Max: 37.6 (09 Feb 2022 16:54)  T(F): 99.3 (10 Feb 2022 00:45), Max: 99.6 (09 Feb 2022 16:54)  HR: 115 (10 Feb 2022 00:45) (106 - 115)  BP: 106/62 (10 Feb 2022 00:45) (106/62 - 144/79)  BP(mean): --  RR: 20 (10 Feb 2022 00:45) (19 - 20)  SpO2: 96% (10 Feb 2022 00:45) (94% - 98%)                        11.9   12.46 )-----------( 382      ( 09 Feb 2022 07:14 )             37.8     02-09    131<L>  |  95<L>  |  18  ----------------------------<  121<H>  4.5   |  23  |  0.73    Ca    9.1      09 Feb 2022 07:17  Phos  3.9     02-08  Mg     1.8     02-08    TPro  7.3  /  Alb  3.4  /  TBili  1.0  /  DBili  x   /  AST  10  /  ALT  11  /  AlkPhos  101  02-08   PT/INR - ( 08 Feb 2022 06:56 )   PT: 14.6 sec;   INR: 1.23 ratio         PTT - ( 08 Feb 2022 06:56 )  PTT:23.6 sec  I&O's Detail    08 Feb 2022 07:01  -  09 Feb 2022 07:00  --------------------------------------------------------  IN:    Oral Fluid: 400 mL  Total IN: 400 mL    OUT:    Chest Tube (mL): 350 mL    Voided (mL): 400 mL  Total OUT: 750 mL    Total NET: -350 mL      09 Feb 2022 07:01  -  10 Feb 2022 05:29  --------------------------------------------------------  IN:    Oral Fluid: 960 mL  Total IN: 960 mL    OUT:    Voided (mL): 850 mL  Total OUT: 850 mL    Total NET: 110 mL        PHYSICAL EXAM:  General: NAD, resting comfortably  Pulmonary: Breathing comfortably, chest tube in place  Cardiovascular: NSR, no murmurs  Abdominal: soft, nontender  Extremities: WWP

## 2022-02-10 NOTE — PROGRESS NOTE ADULT - PROBLEM SELECTOR PLAN 1
Maintain L PTC - LWS  Continue intrapleural TPA BID x 6 doses, 1st dose 2/9  Strict I & Os, document drainage in flowsheets   Daily CXR   Pleural fluid with no growth   F/U Cytology   NPO p MN for possible VATS decort Friday  Plan d/w with Dr. Willett   Continue care as per primary team

## 2022-02-10 NOTE — PROGRESS NOTE ADULT - PROBLEM SELECTOR PLAN 6
surgery eval appreciated  S/p Lap band deflation
surgery eval appreciated  S/p Lap band deflation
surgery f/up appreciated  S/p Lap band deflation

## 2022-02-11 ENCOUNTER — RESULT REVIEW (OUTPATIENT)
Age: 52
End: 2022-02-11

## 2022-02-11 ENCOUNTER — APPOINTMENT (OUTPATIENT)
Dept: THORACIC SURGERY | Facility: HOSPITAL | Age: 52
End: 2022-02-11

## 2022-02-11 LAB
-  CEFTRIAXONE: SIGNIFICANT CHANGE UP
-  CLINDAMYCIN: SIGNIFICANT CHANGE UP
-  ERYTHROMYCIN: SIGNIFICANT CHANGE UP
-  PENICILLIN: SIGNIFICANT CHANGE UP
-  VANCOMYCIN: SIGNIFICANT CHANGE UP
A1C WITH ESTIMATED AVERAGE GLUCOSE RESULT: 6.1 % — HIGH (ref 4–5.6)
ANION GAP SERPL CALC-SCNC: 11 MMOL/L — SIGNIFICANT CHANGE UP (ref 5–17)
APTT BLD: 31.7 SEC — SIGNIFICANT CHANGE UP (ref 27.5–35.5)
BUN SERPL-MCNC: 22 MG/DL — SIGNIFICANT CHANGE UP (ref 7–23)
CALCIUM SERPL-MCNC: 8.7 MG/DL — SIGNIFICANT CHANGE UP (ref 8.4–10.5)
CHLORIDE SERPL-SCNC: 89 MMOL/L — LOW (ref 96–108)
CO2 SERPL-SCNC: 24 MMOL/L — SIGNIFICANT CHANGE UP (ref 22–31)
CREAT SERPL-MCNC: 0.69 MG/DL — SIGNIFICANT CHANGE UP (ref 0.5–1.3)
ESTIMATED AVERAGE GLUCOSE: 128 MG/DL — HIGH (ref 68–114)
GLUCOSE SERPL-MCNC: 170 MG/DL — HIGH (ref 70–99)
GRAM STN FLD: SIGNIFICANT CHANGE UP
HCT VFR BLD CALC: 37 % — SIGNIFICANT CHANGE UP (ref 34.5–45)
HGB BLD-MCNC: 12.2 G/DL — SIGNIFICANT CHANGE UP (ref 11.5–15.5)
INR BLD: 1.18 RATIO — HIGH (ref 0.88–1.16)
MCHC RBC-ENTMCNC: 26.8 PG — LOW (ref 27–34)
MCHC RBC-ENTMCNC: 33 GM/DL — SIGNIFICANT CHANGE UP (ref 32–36)
MCV RBC AUTO: 81.3 FL — SIGNIFICANT CHANGE UP (ref 80–100)
METHOD TYPE: SIGNIFICANT CHANGE UP
METHOD TYPE: SIGNIFICANT CHANGE UP
NIGHT BLUE STAIN TISS: SIGNIFICANT CHANGE UP
NRBC # BLD: 0 /100 WBCS — SIGNIFICANT CHANGE UP (ref 0–0)
PLATELET # BLD AUTO: 496 K/UL — HIGH (ref 150–400)
POTASSIUM SERPL-MCNC: 4.6 MMOL/L — SIGNIFICANT CHANGE UP (ref 3.5–5.3)
POTASSIUM SERPL-SCNC: 4.6 MMOL/L — SIGNIFICANT CHANGE UP (ref 3.5–5.3)
PROTHROM AB SERPL-ACNC: 14.1 SEC — HIGH (ref 10.6–13.6)
RBC # BLD: 4.55 M/UL — SIGNIFICANT CHANGE UP (ref 3.8–5.2)
RBC # FLD: 13.1 % — SIGNIFICANT CHANGE UP (ref 10.3–14.5)
SODIUM SERPL-SCNC: 124 MMOL/L — LOW (ref 135–145)
SPECIMEN SOURCE: SIGNIFICANT CHANGE UP
WBC # BLD: 16.28 K/UL — HIGH (ref 3.8–10.5)
WBC # FLD AUTO: 16.28 K/UL — HIGH (ref 3.8–10.5)

## 2022-02-11 PROCEDURE — 88305 TISSUE EXAM BY PATHOLOGIST: CPT | Mod: 26

## 2022-02-11 PROCEDURE — 32652 THORACOSCOPY REM TOTL CORTEX: CPT

## 2022-02-11 PROCEDURE — 99232 SBSQ HOSP IP/OBS MODERATE 35: CPT

## 2022-02-11 PROCEDURE — 71045 X-RAY EXAM CHEST 1 VIEW: CPT | Mod: 26,76

## 2022-02-11 DEVICE — ARISTA 1GR: Type: IMPLANTABLE DEVICE | Status: FUNCTIONAL

## 2022-02-11 DEVICE — KIT A-LINE 1LUM 20G X 12CM SAFE KIT: Type: IMPLANTABLE DEVICE | Status: FUNCTIONAL

## 2022-02-11 RX ORDER — AZITHROMYCIN 500 MG/1
500 TABLET, FILM COATED ORAL EVERY 24 HOURS
Refills: 0 | Status: DISCONTINUED | OUTPATIENT
Start: 2022-02-11 | End: 2022-02-14

## 2022-02-11 RX ORDER — DORNASE ALFA 1 MG/ML
5 SOLUTION RESPIRATORY (INHALATION) EVERY 12 HOURS
Refills: 0 | Status: DISCONTINUED | OUTPATIENT
Start: 2022-02-11 | End: 2022-02-11

## 2022-02-11 RX ORDER — SODIUM CHLORIDE 9 MG/ML
1000 INJECTION INTRAMUSCULAR; INTRAVENOUS; SUBCUTANEOUS
Refills: 0 | Status: DISCONTINUED | OUTPATIENT
Start: 2022-02-11 | End: 2022-02-13

## 2022-02-11 RX ORDER — ONDANSETRON 8 MG/1
4 TABLET, FILM COATED ORAL EVERY 8 HOURS
Refills: 0 | Status: DISCONTINUED | OUTPATIENT
Start: 2022-02-11 | End: 2022-02-17

## 2022-02-11 RX ORDER — HYDROMORPHONE HYDROCHLORIDE 2 MG/ML
30 INJECTION INTRAMUSCULAR; INTRAVENOUS; SUBCUTANEOUS
Refills: 0 | Status: DISCONTINUED | OUTPATIENT
Start: 2022-02-11 | End: 2022-02-15

## 2022-02-11 RX ORDER — FENTANYL CITRATE 50 UG/ML
50 INJECTION INTRAVENOUS
Refills: 0 | Status: DISCONTINUED | OUTPATIENT
Start: 2022-02-11 | End: 2022-02-11

## 2022-02-11 RX ORDER — HYDROMORPHONE HYDROCHLORIDE 2 MG/ML
0.25 INJECTION INTRAMUSCULAR; INTRAVENOUS; SUBCUTANEOUS
Refills: 0 | Status: DISCONTINUED | OUTPATIENT
Start: 2022-02-11 | End: 2022-02-11

## 2022-02-11 RX ORDER — DEXTROAMPHETAMINE SACCHARATE, AMPHETAMINE ASPARTATE, DEXTROAMPHETAMINE SULFATE AND AMPHETAMINE SULFATE 1.875; 1.875; 1.875; 1.875 MG/1; MG/1; MG/1; MG/1
20 TABLET ORAL
Refills: 0 | Status: DISCONTINUED | OUTPATIENT
Start: 2022-02-11 | End: 2022-02-17

## 2022-02-11 RX ORDER — ACETAMINOPHEN 500 MG
650 TABLET ORAL EVERY 6 HOURS
Refills: 0 | Status: DISCONTINUED | OUTPATIENT
Start: 2022-02-11 | End: 2022-02-17

## 2022-02-11 RX ORDER — OXYCODONE HYDROCHLORIDE 5 MG/1
5 TABLET ORAL EVERY 4 HOURS
Refills: 0 | Status: DISCONTINUED | OUTPATIENT
Start: 2022-02-11 | End: 2022-02-11

## 2022-02-11 RX ORDER — CEFTRIAXONE 500 MG/1
1000 INJECTION, POWDER, FOR SOLUTION INTRAMUSCULAR; INTRAVENOUS EVERY 24 HOURS
Refills: 0 | Status: DISCONTINUED | OUTPATIENT
Start: 2022-02-11 | End: 2022-02-14

## 2022-02-11 RX ORDER — BUPROPION HYDROCHLORIDE 150 MG/1
300 TABLET, EXTENDED RELEASE ORAL DAILY
Refills: 0 | Status: DISCONTINUED | OUTPATIENT
Start: 2022-02-11 | End: 2022-02-17

## 2022-02-11 RX ORDER — MORPHINE SULFATE 50 MG/1
4 CAPSULE, EXTENDED RELEASE ORAL EVERY 4 HOURS
Refills: 0 | Status: DISCONTINUED | OUTPATIENT
Start: 2022-02-11 | End: 2022-02-11

## 2022-02-11 RX ORDER — ONDANSETRON 8 MG/1
4 TABLET, FILM COATED ORAL ONCE
Refills: 0 | Status: DISCONTINUED | OUTPATIENT
Start: 2022-02-11 | End: 2022-02-11

## 2022-02-11 RX ORDER — HYDROMORPHONE HYDROCHLORIDE 2 MG/ML
0.5 INJECTION INTRAMUSCULAR; INTRAVENOUS; SUBCUTANEOUS
Refills: 0 | Status: DISCONTINUED | OUTPATIENT
Start: 2022-02-11 | End: 2022-02-11

## 2022-02-11 RX ADMIN — HYDROMORPHONE HYDROCHLORIDE 30 MILLILITER(S): 2 INJECTION INTRAMUSCULAR; INTRAVENOUS; SUBCUTANEOUS at 23:25

## 2022-02-11 RX ADMIN — FENTANYL CITRATE 50 MICROGRAM(S): 50 INJECTION INTRAVENOUS at 11:59

## 2022-02-11 RX ADMIN — FENTANYL CITRATE 50 MICROGRAM(S): 50 INJECTION INTRAVENOUS at 11:43

## 2022-02-11 RX ADMIN — OXYCODONE HYDROCHLORIDE 5 MILLIGRAM(S): 5 TABLET ORAL at 00:15

## 2022-02-11 RX ADMIN — CEFTRIAXONE 100 MILLIGRAM(S): 500 INJECTION, POWDER, FOR SOLUTION INTRAMUSCULAR; INTRAVENOUS at 17:24

## 2022-02-11 RX ADMIN — OXYCODONE HYDROCHLORIDE 5 MILLIGRAM(S): 5 TABLET ORAL at 00:45

## 2022-02-11 RX ADMIN — FENTANYL CITRATE 50 MICROGRAM(S): 50 INJECTION INTRAVENOUS at 12:41

## 2022-02-11 RX ADMIN — FENTANYL CITRATE 50 MICROGRAM(S): 50 INJECTION INTRAVENOUS at 12:11

## 2022-02-11 RX ADMIN — HYDROMORPHONE HYDROCHLORIDE 30 MILLILITER(S): 2 INJECTION INTRAMUSCULAR; INTRAVENOUS; SUBCUTANEOUS at 20:10

## 2022-02-11 RX ADMIN — HYDROMORPHONE HYDROCHLORIDE 30 MILLILITER(S): 2 INJECTION INTRAMUSCULAR; INTRAVENOUS; SUBCUTANEOUS at 12:35

## 2022-02-11 RX ADMIN — SODIUM CHLORIDE 50 MILLILITER(S): 9 INJECTION INTRAMUSCULAR; INTRAVENOUS; SUBCUTANEOUS at 12:30

## 2022-02-11 RX ADMIN — AZITHROMYCIN 255 MILLIGRAM(S): 500 TABLET, FILM COATED ORAL at 17:55

## 2022-02-11 NOTE — BRIEF OPERATIVE NOTE - OPERATION/FINDINGS
- VATS  - Removal of old chest tube  - Decortication with biopsy of lung peel  - Placement of two chest tubes

## 2022-02-11 NOTE — PROGRESS NOTE ADULT - PROBLEM SELECTOR PLAN 1
Maintain L PTC - LWS  Continue intrapleural TPA BID x 6 doses, 1st dose 2/9  Strict I & Os, document drainage in flowsheets   Daily CXR   Pleural fluid with no growth   F/U Cytology   NPO VATS decort Friday  Plan d/w with Dr. Willett   Continue care as per primary team Maintain L PTC - LWS  Continue intrapleural TPA BID x 6 doses, 1st dose 2/9  Strict I & Os, document drainage in flowsheets   Daily CXR   Pleural fluid with no growth   F/U Cytology   NPO VATS decort Friday, Hold dvt prophylaxis and toradol x 8 hours  Plan d/w with Dr. Willett   Continue care as per primary team

## 2022-02-11 NOTE — PRE-ANESTHESIA EVALUATION ADULT - NSANTHPMHFT_GEN_ALL_CORE
51F with history of depression and ADHD, lap gastric band several years ago who presented with left pleural effusion/empyema on ceftriaxone/azithromycin, s/p left pigtail chest tube. She has history of severe GERD likely 2/2 gastric band, now that it has been deflated, her GERD symptoms have resolved.

## 2022-02-11 NOTE — PRE-ANESTHESIA EVALUATION ADULT - NSANTHOSAYNRD_GEN_A_CORE
History of "borderline DANITZA" prior to lap band, has not be evaluated since. Does not use CPAP./Yes

## 2022-02-11 NOTE — PROGRESS NOTE ADULT - SUBJECTIVE AND OBJECTIVE BOX
VITAL SIGNS    Vital Signs Last 24 Hrs  T(C): 36.7 (02-11-22 @ 07:27), Max: 37.2 (02-10-22 @ 17:06)  T(F): 98.1 (02-11-22 @ 06:45), Max: 98.9 (02-10-22 @ 17:06)  HR: 70 (02-11-22 @ 07:27) (70 - 111)  BP: 123/82 (02-11-22 @ 07:27) (105/72 - 133/80)  RR: 16 (02-11-22 @ 07:27) (16 - 20)  SpO2: 96% (02-11-22 @ 07:27) (92% - 97%)            02-10 @ 07:01  -  02-11 @ 07:00  --------------------------------------------------------  IN: 960 mL / OUT: 1610 mL / NET: -650 mL       Daily     Daily   Admit Wt: Drug Dosing Weight    Weight (kg): 149.7 (11 Feb 2022 07:27)      CAPILLARY BLOOD GLUCOSE              MEDICATIONS  influenza   Vaccine 0.5 milliLiter(s) IntraMuscular once      >>> <<<  PHYSICAL EXAM  Subjective: NAD  Neurology: alert and oriented x 3, nonfocal, no gross deficits  CV : s1s2  Lungs:left pigtail  Abdomen: soft, NT,ND, (-)BM  :  voiding  Extremities:   -c/c/e    LABS  02-11    124<L>  |  89<L>  |  22  ----------------------------<  170<H>  4.6   |  24  |  0.69    Ca    8.7      11 Feb 2022 07:24                                   12.2   16.28 )-----------( 496      ( 11 Feb 2022 07:23 )             37.0          PT/INR - ( 11 Feb 2022 07:23 )   PT: 14.1 sec;   INR: 1.18 ratio         PTT - ( 11 Feb 2022 07:23 )  PTT:31.7 sec       PAST MEDICAL & SURGICAL HISTORY:  Anxiety and depression    ADHD (attention deficit hyperactivity disorder)    Class 3 severe obesity in adult    H/O laparoscopic adjustable gastric banding

## 2022-02-11 NOTE — PROGRESS NOTE ADULT - ASSESSMENT
51F w/ PMHx of depression, obesity s/p lap band by Dr. Acevedo in 2010 who presents to the ED with a 4 day history of vague epigastric/LUQ pain that radiated to the left chest and scapula. Patient refers having poor PO intake, some nausea but no vomiting, passing gas and having BMs.     In the ED, patient was afebrile, tachycardic to 110s, normotensive, labs remarkable for leukocytosis of 19k, otherwise WNL. CTAP revealed oral contrast revealed adequate lap band placement, contrast in stomach, small bowel, and esophagus. Also, large left pleural effusion is partially appreciated. Thoracic surgery consulted for evaluation of pleural effusion.    2/8 VSS; patient is comfortable on 2L NC. L PTC - LWS in place   2/9 VSS, plan for intrapleural TPA via left pigtail today, hold anticoagulation.  2/10 VSS, continue intrapleural TPA, keep NPO p MN for possible VATS decort Friday.  2/11 NPO Left VATs decortication today.

## 2022-02-12 LAB
ANION GAP SERPL CALC-SCNC: 13 MMOL/L — SIGNIFICANT CHANGE UP (ref 5–17)
ANION GAP SERPL CALC-SCNC: 14 MMOL/L — SIGNIFICANT CHANGE UP (ref 5–17)
BUN SERPL-MCNC: 25 MG/DL — HIGH (ref 7–23)
BUN SERPL-MCNC: 30 MG/DL — HIGH (ref 7–23)
CALCIUM SERPL-MCNC: 8.4 MG/DL — SIGNIFICANT CHANGE UP (ref 8.4–10.5)
CALCIUM SERPL-MCNC: 8.4 MG/DL — SIGNIFICANT CHANGE UP (ref 8.4–10.5)
CHLORIDE SERPL-SCNC: 90 MMOL/L — LOW (ref 96–108)
CHLORIDE SERPL-SCNC: 92 MMOL/L — LOW (ref 96–108)
CO2 SERPL-SCNC: 23 MMOL/L — SIGNIFICANT CHANGE UP (ref 22–31)
CO2 SERPL-SCNC: 24 MMOL/L — SIGNIFICANT CHANGE UP (ref 22–31)
CREAT SERPL-MCNC: 0.7 MG/DL — SIGNIFICANT CHANGE UP (ref 0.5–1.3)
CREAT SERPL-MCNC: 0.84 MG/DL — SIGNIFICANT CHANGE UP (ref 0.5–1.3)
CULTURE RESULTS: SIGNIFICANT CHANGE UP
CULTURE RESULTS: SIGNIFICANT CHANGE UP
GLUCOSE SERPL-MCNC: 160 MG/DL — HIGH (ref 70–99)
GLUCOSE SERPL-MCNC: 207 MG/DL — HIGH (ref 70–99)
HCT VFR BLD CALC: 38.1 % — SIGNIFICANT CHANGE UP (ref 34.5–45)
HGB BLD-MCNC: 12.2 G/DL — SIGNIFICANT CHANGE UP (ref 11.5–15.5)
MAGNESIUM SERPL-MCNC: 2.4 MG/DL — SIGNIFICANT CHANGE UP (ref 1.6–2.6)
MCHC RBC-ENTMCNC: 27 PG — SIGNIFICANT CHANGE UP (ref 27–34)
MCHC RBC-ENTMCNC: 32 GM/DL — SIGNIFICANT CHANGE UP (ref 32–36)
MCV RBC AUTO: 84.3 FL — SIGNIFICANT CHANGE UP (ref 80–100)
NRBC # BLD: 0 /100 WBCS — SIGNIFICANT CHANGE UP (ref 0–0)
PHOSPHATE SERPL-MCNC: 4 MG/DL — SIGNIFICANT CHANGE UP (ref 2.5–4.5)
PLATELET # BLD AUTO: 526 K/UL — HIGH (ref 150–400)
POTASSIUM SERPL-MCNC: 5.2 MMOL/L — SIGNIFICANT CHANGE UP (ref 3.5–5.3)
POTASSIUM SERPL-MCNC: 5.7 MMOL/L — HIGH (ref 3.5–5.3)
POTASSIUM SERPL-SCNC: 5.2 MMOL/L — SIGNIFICANT CHANGE UP (ref 3.5–5.3)
POTASSIUM SERPL-SCNC: 5.7 MMOL/L — HIGH (ref 3.5–5.3)
RBC # BLD: 4.52 M/UL — SIGNIFICANT CHANGE UP (ref 3.8–5.2)
RBC # FLD: 13.3 % — SIGNIFICANT CHANGE UP (ref 10.3–14.5)
SODIUM SERPL-SCNC: 127 MMOL/L — LOW (ref 135–145)
SODIUM SERPL-SCNC: 129 MMOL/L — LOW (ref 135–145)
SPECIMEN SOURCE: SIGNIFICANT CHANGE UP
SPECIMEN SOURCE: SIGNIFICANT CHANGE UP
WBC # BLD: 18.37 K/UL — HIGH (ref 3.8–10.5)
WBC # FLD AUTO: 18.37 K/UL — HIGH (ref 3.8–10.5)

## 2022-02-12 PROCEDURE — 71045 X-RAY EXAM CHEST 1 VIEW: CPT | Mod: 26

## 2022-02-12 PROCEDURE — 99251: CPT

## 2022-02-12 PROCEDURE — 99232 SBSQ HOSP IP/OBS MODERATE 35: CPT

## 2022-02-12 RX ADMIN — SODIUM CHLORIDE 50 MILLILITER(S): 9 INJECTION INTRAMUSCULAR; INTRAVENOUS; SUBCUTANEOUS at 07:45

## 2022-02-12 RX ADMIN — CEFTRIAXONE 100 MILLIGRAM(S): 500 INJECTION, POWDER, FOR SOLUTION INTRAMUSCULAR; INTRAVENOUS at 18:06

## 2022-02-12 RX ADMIN — AZITHROMYCIN 255 MILLIGRAM(S): 500 TABLET, FILM COATED ORAL at 18:06

## 2022-02-12 RX ADMIN — BUPROPION HYDROCHLORIDE 300 MILLIGRAM(S): 150 TABLET, EXTENDED RELEASE ORAL at 11:40

## 2022-02-12 RX ADMIN — HYDROMORPHONE HYDROCHLORIDE 30 MILLILITER(S): 2 INJECTION INTRAMUSCULAR; INTRAVENOUS; SUBCUTANEOUS at 19:24

## 2022-02-12 RX ADMIN — HYDROMORPHONE HYDROCHLORIDE 30 MILLILITER(S): 2 INJECTION INTRAMUSCULAR; INTRAVENOUS; SUBCUTANEOUS at 07:41

## 2022-02-12 NOTE — PROGRESS NOTE ADULT - ASSESSMENT
51F w/ PMHx of depression, obesity s/p lap band by Dr. Acevedo in 2010 who presents to the ED with a 4 day history of vague epigastric/LUQ pain that radiated to the left chest and scapula. Patient refers having poor PO intake, some nausea but no vomiting, passing gas and having BMs.     In the ED, patient was afebrile, tachycardic to 110s, normotensive, labs remarkable for leukocytosis of 19k, otherwise WNL. CTAP revealed oral contrast revealed adequate lap band placement, contrast in stomach, small bowel, and esophagus. Also, large left pleural effusion is partially appreciated. Thoracic surgery consulted for evaluation of pleural effusion.    2/8 VSS; patient is comfortable on 2L NC. L PTC - LWS in place   2/9 VSS, plan for intrapleural TPA via left pigtail today, hold anticoagulation.  2/10 VSS, continue intrapleural TPA, keep NPO p MN for possible VATS decort Friday.  2/11 NPO Left VATs decortication today.   2/12 maintain chest tubes to LWS, PCA pump, CXR negative for ptx. OR cultures pending  Consuelo Cardenas

## 2022-02-12 NOTE — PROGRESS NOTE ADULT - SUBJECTIVE AND OBJECTIVE BOX
VITAL SIGNS      Vital Signs Last 24 Hrs  T(C): 36.5 (02-12-22 @ 08:00), Max: 36.5 (02-12-22 @ 08:00)  T(F): 97.7 (02-12-22 @ 08:00), Max: 97.7 (02-12-22 @ 08:00)  HR: 97 (02-12-22 @ 08:00) (87 - 97)  BP: 128/83 (02-12-22 @ 08:00) (101/67 - 136/78)  RR: 17 (02-12-22 @ 08:00) (6 - 18)  SpO2: 92% (02-12-22 @ 08:00) (92% - 100%)            02-11 @ 07:01  -  02-12 @ 07:00  --------------------------------------------------------  IN: 1280 mL / OUT: 650 mL / NET: 630 mL       Daily     Daily   Admit Wt: Drug Dosing Weight    Weight (kg): 149.7 (11 Feb 2022 07:27)      CAPILLARY BLOOD GLUCOSE              MEDICATIONS  acetaminophen     Tablet .. 650 milliGRAM(s) Oral every 6 hours PRN  amphetamine/dextroamphetamine XR 20 milliGRAM(s) Oral with breakfast  azithromycin  IVPB 500 milliGRAM(s) IV Intermittent every 24 hours  buPROPion XL (24-Hour) . 300 milliGRAM(s) Oral daily  cefTRIAXone   IVPB 1000 milliGRAM(s) IV Intermittent every 24 hours  HYDROmorphone PCA (1 mG/mL) 30 milliLiter(s) PCA Continuous PCA Continuous  influenza   Vaccine 0.5 milliLiter(s) IntraMuscular once  ondansetron Injectable 4 milliGRAM(s) IV Push every 8 hours PRN  sodium chloride 0.9%. 1000 milliLiter(s) IV Continuous <Continuous>      >>> <<<  PHYSICAL EXAM  Subjective: NAD  Neurology: alert and oriented x 3, nonfocal, no gross deficits  CV :s1s2  Left chest tube to lws -10/50 posterior, anterior -50/75  Lungs: CTA b/l  Abdomen: soft, NT,ND, (- )BM +BS /flatus  :  voiding  Extremities:  -c/c/e     LABS  02-12    129<L>  |  92<L>  |  25<H>  ----------------------------<  160<H>  5.7<H>   |  23  |  0.70    Ca    8.4      12 Feb 2022 07:11  Phos  4.0     02-12  Mg     2.4     02-12                                   12.2   18.37 )-----------( 526      ( 12 Feb 2022 07:12 )             38.1          PT/INR - ( 11 Feb 2022 07:23 )   PT: 14.1 sec;   INR: 1.18 ratio         PTT - ( 11 Feb 2022 07:23 )  PTT:31.7 sec       PAST MEDICAL & SURGICAL HISTORY:  Anxiety and depression    ADHD (attention deficit hyperactivity disorder)    Class 3 severe obesity in adult    H/O laparoscopic adjustable gastric banding

## 2022-02-12 NOTE — PROGRESS NOTE ADULT - SUBJECTIVE AND OBJECTIVE BOX
Day 1 of Anesthesia Pain Management Service    SUBJECTIVE: Patient is doing well with IV PCA    Pain Scale Score:	[X] Refer to charted pain scores    THERAPY:    [ ] IV PCA Morphine		[ ] 5 mg/mL	[ ] 1 mg/mL  [X] IV PCA Hydromorphone	[ ] 5 mg/mL	[X] 1 mg/mL  [ ] IV PCA Fentanyl		[ ] 50 micrograms/mL    Demand dose: 0.2 mg     Lockout: 6 minutes   Continuous Rate: 0 mg/hr  4 Hour Limit: 4 mg    MEDICATIONS  (STANDING):  amphetamine/dextroamphetamine XR 20 milliGRAM(s) Oral with breakfast  azithromycin  IVPB 500 milliGRAM(s) IV Intermittent every 24 hours  buPROPion XL (24-Hour) . 300 milliGRAM(s) Oral daily  cefTRIAXone   IVPB 1000 milliGRAM(s) IV Intermittent every 24 hours  HYDROmorphone PCA (1 mG/mL) 30 milliLiter(s) PCA Continuous PCA Continuous  influenza   Vaccine 0.5 milliLiter(s) IntraMuscular once  sodium chloride 0.9%. 1000 milliLiter(s) (50 mL/Hr) IV Continuous <Continuous>    MEDICATIONS  (PRN):  acetaminophen     Tablet .. 650 milliGRAM(s) Oral every 6 hours PRN Temp greater or equal to 38C (100.4F), Mild Pain (1 - 3)  ondansetron Injectable 4 milliGRAM(s) IV Push every 8 hours PRN Nausea and/or Vomiting      OBJECTIVE:    Sedation Score:	[ X] Alert	[ ] Drowsy 	[ ] Arousable	[ ] Asleep	[ ] Unresponsive    Side Effects:	[X ] None	[ ] Nausea	[ ] Vomiting	[ ] Pruritus  		[ ] Other:    Vital Signs Last 24 Hrs  T(C): 36.5 (12 Feb 2022 08:00), Max: 36.5 (12 Feb 2022 08:00)  T(F): 97.7 (12 Feb 2022 08:00), Max: 97.7 (12 Feb 2022 08:00)  HR: 97 (12 Feb 2022 08:00) (87 - 97)  BP: 128/83 (12 Feb 2022 08:00) (101/67 - 136/78)  BP(mean): 90 (11 Feb 2022 23:05) (74 - 101)  RR: 17 (12 Feb 2022 08:00) (6 - 18)  SpO2: 92% (12 Feb 2022 08:00) (92% - 100%)    ASSESSMENT/ PLAN    Therapy to  be:               [X] Continued   [ ] Discontinued   [ ] Changed to PRN Analgesics    Documentation and Verification of current medications:   [X] Done	[ ] Not done, not eligible    Comments:

## 2022-02-12 NOTE — PROGRESS NOTE ADULT - PROBLEM SELECTOR PLAN 1
Maintain Left chest tubes - LWS  Strict I & Os, document drainage in flowsheets   Daily CXR   F/U Cytology   Reinstate dvt prophylaxis   Plan d/w with Dr. Willett   Continue care as per primary team

## 2022-02-12 NOTE — PROGRESS NOTE ADULT - SUBJECTIVE AND OBJECTIVE BOX
Patient is a 51y old  Female who presents with a chief complaint of 51F p/w sob, abdominal pain and chest pain (12 Feb 2022 10:09)      SUBJECTIVE / OVERNIGHT EVENTS:    Events noted.  CONSTITUTIONAL: No fever,  or fatigue  RESPIRATORY: No cough, wheezing,  No shortness of breath  CARDIOVASCULAR: No chest pain, palpitations, dizziness, or leg swelling  GASTROINTESTINAL: Abd discomfort      MEDICATIONS  (STANDING):  amphetamine/dextroamphetamine XR 20 milliGRAM(s) Oral with breakfast  azithromycin  IVPB 500 milliGRAM(s) IV Intermittent every 24 hours  buPROPion XL (24-Hour) . 300 milliGRAM(s) Oral daily  cefTRIAXone   IVPB 1000 milliGRAM(s) IV Intermittent every 24 hours  HYDROmorphone PCA (1 mG/mL) 30 milliLiter(s) PCA Continuous PCA Continuous  influenza   Vaccine 0.5 milliLiter(s) IntraMuscular once  sodium chloride 0.9%. 1000 milliLiter(s) (50 mL/Hr) IV Continuous <Continuous>    MEDICATIONS  (PRN):  acetaminophen     Tablet .. 650 milliGRAM(s) Oral every 6 hours PRN Temp greater or equal to 38C (100.4F), Mild Pain (1 - 3)  ondansetron Injectable 4 milliGRAM(s) IV Push every 8 hours PRN Nausea and/or Vomiting        CAPILLARY BLOOD GLUCOSE        I&O's Summary    11 Feb 2022 07:01  -  12 Feb 2022 07:00  --------------------------------------------------------  IN: 1280 mL / OUT: 650 mL / NET: 630 mL    12 Feb 2022 07:01  -  12 Feb 2022 20:11  --------------------------------------------------------  IN: 600 mL / OUT: 320 mL / NET: 280 mL        T(C): 36.4 (02-12-22 @ 16:50), Max: 36.7 (02-12-22 @ 13:51)  HR: 98 (02-12-22 @ 16:50) (87 - 98)  BP: 118/74 (02-12-22 @ 16:50) (101/67 - 132/83)  RR: 17 (02-12-22 @ 16:50) (16 - 18)  SpO2: 96% (02-12-22 @ 16:50) (92% - 100%)    PHYSICAL EXAM:    NECK: Supple, No JVD  CHEST/LUNG: Clear to auscultation bilaterally; No wheezing.  HEART: Regular rate and rhythm; No murmurs, rubs, or gallops  ABDOMEN: Soft, Nontender, Nondistended; Bowel sounds present  EXTREMITIES:   No edema  NEUROLOGY: AAO       LABS:                        12.2   18.37 )-----------( 526      ( 12 Feb 2022 07:12 )             38.1     02-12    127<L>  |  90<L>  |  30<H>  ----------------------------<  207<H>  5.2   |  24  |  0.84    Ca    8.4      12 Feb 2022 12:48  Phos  4.0     02-12  Mg     2.4     02-12      PT/INR - ( 11 Feb 2022 07:23 )   PT: 14.1 sec;   INR: 1.18 ratio         PTT - ( 11 Feb 2022 07:23 )  PTT:31.7 sec        CAPILLARY BLOOD GLUCOSE            RADIOLOGY & ADDITIONAL TESTS:    Imaging Personally Reviewed:    Consultant(s) Notes Reviewed:      Care Discussed with Consultants/Other Providers:    Ronak Mcdonnell MD, CMD, FACP    [address] [place] [address]  Office Tel: [phone]  Cell: [phone]

## 2022-02-12 NOTE — PROGRESS NOTE ADULT - ASSESSMENT
51F w/ class III obesity s/p miguel (2010) p/w sob, abdominal pain and chest pain found to have left-sided loculated pleural effusion with sepsis    Loculated pleural effusion:    Chest tube +  S/p VATs   Thoracic Sx f/up appreciated

## 2022-02-13 LAB
ANION GAP SERPL CALC-SCNC: 10 MMOL/L — SIGNIFICANT CHANGE UP (ref 5–17)
BUN SERPL-MCNC: 33 MG/DL — HIGH (ref 7–23)
CALCIUM SERPL-MCNC: 8.2 MG/DL — LOW (ref 8.4–10.5)
CHLORIDE SERPL-SCNC: 92 MMOL/L — LOW (ref 96–108)
CO2 SERPL-SCNC: 24 MMOL/L — SIGNIFICANT CHANGE UP (ref 22–31)
CREAT SERPL-MCNC: 0.73 MG/DL — SIGNIFICANT CHANGE UP (ref 0.5–1.3)
CULTURE RESULTS: SIGNIFICANT CHANGE UP
GLUCOSE SERPL-MCNC: 172 MG/DL — HIGH (ref 70–99)
ORGANISM # SPEC MICROSCOPIC CNT: SIGNIFICANT CHANGE UP
POTASSIUM SERPL-MCNC: 4.8 MMOL/L — SIGNIFICANT CHANGE UP (ref 3.5–5.3)
POTASSIUM SERPL-SCNC: 4.8 MMOL/L — SIGNIFICANT CHANGE UP (ref 3.5–5.3)
SODIUM SERPL-SCNC: 126 MMOL/L — LOW (ref 135–145)
SPECIMEN SOURCE: SIGNIFICANT CHANGE UP

## 2022-02-13 PROCEDURE — 71045 X-RAY EXAM CHEST 1 VIEW: CPT | Mod: 26

## 2022-02-13 RX ADMIN — CEFTRIAXONE 100 MILLIGRAM(S): 500 INJECTION, POWDER, FOR SOLUTION INTRAMUSCULAR; INTRAVENOUS at 18:40

## 2022-02-13 RX ADMIN — HYDROMORPHONE HYDROCHLORIDE 30 MILLILITER(S): 2 INJECTION INTRAMUSCULAR; INTRAVENOUS; SUBCUTANEOUS at 07:33

## 2022-02-13 RX ADMIN — AZITHROMYCIN 255 MILLIGRAM(S): 500 TABLET, FILM COATED ORAL at 18:55

## 2022-02-13 RX ADMIN — HYDROMORPHONE HYDROCHLORIDE 30 MILLILITER(S): 2 INJECTION INTRAMUSCULAR; INTRAVENOUS; SUBCUTANEOUS at 19:43

## 2022-02-13 RX ADMIN — ONDANSETRON 4 MILLIGRAM(S): 8 TABLET, FILM COATED ORAL at 18:41

## 2022-02-13 RX ADMIN — BUPROPION HYDROCHLORIDE 300 MILLIGRAM(S): 150 TABLET, EXTENDED RELEASE ORAL at 12:02

## 2022-02-13 RX ADMIN — HYDROMORPHONE HYDROCHLORIDE 30 MILLILITER(S): 2 INJECTION INTRAMUSCULAR; INTRAVENOUS; SUBCUTANEOUS at 10:40

## 2022-02-13 NOTE — PROGRESS NOTE ADULT - SUBJECTIVE AND OBJECTIVE BOX
Subjective: "I feel better now "  Patient states that overall her breathing has improved since her VATS/placement of chest tubes.  Denies chest pain/sob.  Pain is well controlled on PCA pump      VITAL SIGNS    Vital Signs Last 24 Hrs  T(C): 36.6 (22 @ 07:53), Max: 36.7 (22 @ 13:51)  T(F): 97.8 (22 @ 07:53), Max: 98 (22 @ 13:51)  HR: 97 (22 @ 07:53) (91 - 102)  BP: 109/70 (22 @ 07:53) (107/62 - 126/83)  RR: 16 (22 @ 07:53) (16 - 18)  SpO2: 95% (22 @ 07:53) (92% - 96%)             @ 07:01  -   @ 07:00  --------------------------------------------------------  IN: 600 mL / OUT: 420 mL / NET: 180 mL       Daily     Daily Weight in k.2 (2022 08:56)  Admit Wt: Drug Dosing Weight    Weight (kg): 149.7 (2022 07:27)      CAPILLARY BLOOD GLUCOSE      PHYSICAL EXAM    Neurology: alert and oriented x 3, nonfocal, no gross deficits  CV : RRR +S1/s2  Lungs: Left lung clear but diminished, right lung clear throughout. RR easy, unlabored   Abdomen: soft, nontender, nondistended, positive bowel sounds, +BM    :  pt voiding without difficulty       Chest tubes: Two left pleural chest tubes to suction.  Posterior tube =        Anterior Tube=         acetaminophen     Tablet .. 650 milliGRAM(s) Oral every 6 hours PRN  amphetamine/dextroamphetamine XR 20 milliGRAM(s) Oral with breakfast  azithromycin  IVPB 500 milliGRAM(s) IV Intermittent every 24 hours  buPROPion XL (24-Hour) . 300 milliGRAM(s) Oral daily  cefTRIAXone   IVPB 1000 milliGRAM(s) IV Intermittent every 24 hours  HYDROmorphone PCA (1 mG/mL) 30 milliLiter(s) PCA Continuous PCA Continuous  influenza   Vaccine 0.5 milliLiter(s) IntraMuscular once  ondansetron Injectable 4 milliGRAM(s) IV Push every 8 hours PRN  sodium chloride 0.9%. 1000 milliLiter(s) IV Continuous <Continuous>                         Subjective: "I feel better now "  Patient states that overall her breathing has improved since her VATS/placement of chest tubes.  Denies chest pain/sob.  Pain is well controlled on PCA pump      VITAL SIGNS    Vital Signs Last 24 Hrs  T(C): 36.6 (22 @ 07:53), Max: 36.7 (22 @ 13:51)  T(F): 97.8 (22 @ 07:53), Max: 98 (22 @ 13:51)  HR: 97 (22 @ 07:53) (91 - 102)  BP: 109/70 (22 @ 07:53) (107/62 - 126/83)  RR: 16 (22 @ 07:53) (16 - 18)  SpO2: 95% (22 @ 07:53) (92% - 96%)             @ 07:01  -   @ 07:00  --------------------------------------------------------  IN: 600 mL / OUT: 420 mL / NET: 180 mL       Daily     Daily Weight in k.2 (2022 08:56)  Admit Wt: Drug Dosing Weight    Weight (kg): 149.7 (2022 07:27)      CAPILLARY BLOOD GLUCOSE      PHYSICAL EXAM    Neurology: alert and oriented x 3, nonfocal, no gross deficits  CV : RRR +S1/s2  Lungs: Left lung clear but diminished, right lung clear throughout. RR easy, unlabored   Abdomen: soft, nontender, nondistended, positive bowel sounds, +BM    :  pt voiding without difficulty       Chest tubes: Two left pleural chest tubes to suction.  Posterior tube = 20cc out overnight of serosang drainage       Diaphragm Tube= 0cc out overnight         acetaminophen     Tablet .. 650 milliGRAM(s) Oral every 6 hours PRN  amphetamine/dextroamphetamine XR 20 milliGRAM(s) Oral with breakfast  azithromycin  IVPB 500 milliGRAM(s) IV Intermittent every 24 hours  buPROPion XL (24-Hour) . 300 milliGRAM(s) Oral daily  cefTRIAXone   IVPB 1000 milliGRAM(s) IV Intermittent every 24 hours  HYDROmorphone PCA (1 mG/mL) 30 milliLiter(s) PCA Continuous PCA Continuous  influenza   Vaccine 0.5 milliLiter(s) IntraMuscular once  ondansetron Injectable 4 milliGRAM(s) IV Push every 8 hours PRN  sodium chloride 0.9%. 1000 milliLiter(s) IV Continuous <Continuous>

## 2022-02-13 NOTE — PROGRESS NOTE ADULT - PROBLEM SELECTOR PLAN 1
Maintain Left chest tubes - LWS  Strict I & Os, document drainage in flowsheets   Daily CXR   F/U Cytology   Reinstate dvt prophylaxis   Plan d/w with Dr. Willett   Continue care as per primary team Maintain Left chest tubes - LWS  Strict I & Os, document drainage in flowsheets   Daily CXR   OR Cultures NTD    Reinstate dvt prophylaxis   Patient encouraged to eat meals OOB to chair   Continue care as per primary team

## 2022-02-13 NOTE — PROGRESS NOTE ADULT - ASSESSMENT
51F w/ PMHx of depression, obesity s/p lap band by Dr. Acevedo in 2010 who presents to the ED with a 4 day history of vague epigastric/LUQ pain that radiated to the left chest and scapula. Patient refers having poor PO intake, some nausea but no vomiting, passing gas and having BMs.     In the ED, patient was afebrile, tachycardic to 110s, normotensive, labs remarkable for leukocytosis of 19k, otherwise WNL. CTAP revealed oral contrast revealed adequate lap band placement, contrast in stomach, small bowel, and esophagus. Also, large left pleural effusion is partially appreciated. Thoracic surgery consulted for evaluation of pleural effusion.    2/8 VSS; patient is comfortable on 2L NC. L PTC - LWS in place   2/9 VSS, plan for intrapleural TPA via left pigtail today, hold anticoagulation.  2/10 VSS, continue intrapleural TPA, keep NPO p MN for possible VATS decort Friday.  2/11 NPO Left VATs decortication today.   2/12 maintain chest tubes to LWS, PCA pump, CXR negative for ptx. OR cultures pending   2/13  Will maintain chest tubes to LWS.  Pain well controlled on PCA pump.  Encouraged to eat meals OOB to recliner.

## 2022-02-13 NOTE — PROGRESS NOTE ADULT - SUBJECTIVE AND OBJECTIVE BOX
Day 2 of Anesthesia Pain Management Service    SUBJECTIVE: Patient is doing well with IV PCA    Pain Scale Score:	[X] Refer to charted pain scores    THERAPY:    [ ] IV PCA Morphine		[ ] 5 mg/mL	[ ] 1 mg/mL  [X] IV PCA Hydromorphone	[ ] 5 mg/mL	[X] 1 mg/mL  [ ] IV PCA Fentanyl		[ ] 50 micrograms/mL    Demand dose: 0.2 mg     Lockout: 6 minutes   Continuous Rate: 0 mg/hr  4 Hour Limit: 4 mg    MEDICATIONS  (STANDING):  amphetamine/dextroamphetamine XR 20 milliGRAM(s) Oral with breakfast  azithromycin  IVPB 500 milliGRAM(s) IV Intermittent every 24 hours  buPROPion XL (24-Hour) . 300 milliGRAM(s) Oral daily  cefTRIAXone   IVPB 1000 milliGRAM(s) IV Intermittent every 24 hours  HYDROmorphone PCA (1 mG/mL) 30 milliLiter(s) PCA Continuous PCA Continuous  influenza   Vaccine 0.5 milliLiter(s) IntraMuscular once  sodium chloride 0.9%. 1000 milliLiter(s) (50 mL/Hr) IV Continuous <Continuous>    MEDICATIONS  (PRN):  acetaminophen     Tablet .. 650 milliGRAM(s) Oral every 6 hours PRN Temp greater or equal to 38C (100.4F), Mild Pain (1 - 3)  ondansetron Injectable 4 milliGRAM(s) IV Push every 8 hours PRN Nausea and/or Vomiting      OBJECTIVE:    Sedation Score:	[ X] Alert	[ ] Drowsy 	[ ] Arousable	[ ] Asleep	[ ] Unresponsive    Side Effects:	[X ] None	[ ] Nausea	[ ] Vomiting	[ ] Pruritus  		[ ] Other:    Vital Signs Last 24 Hrs  T(C): 36.6 (13 Feb 2022 07:53), Max: 36.7 (12 Feb 2022 13:51)  T(F): 97.8 (13 Feb 2022 07:53), Max: 98 (12 Feb 2022 13:51)  HR: 97 (13 Feb 2022 07:53) (91 - 102)  BP: 109/70 (13 Feb 2022 07:53) (107/62 - 126/83)  BP(mean): --  RR: 16 (13 Feb 2022 07:53) (16 - 18)  SpO2: 95% (13 Feb 2022 07:53) (92% - 96%)    ASSESSMENT/ PLAN    Therapy to  be:               [X] Continued   [ ] Discontinued   [ ] Changed to PRN Analgesics    Documentation and Verification of current medications:   [X] Done	[ ] Not done, not eligible    Comments:

## 2022-02-13 NOTE — CHART NOTE - NSCHARTNOTEFT_GEN_A_CORE
CXR today: Left chest tubes again noted. No pneumothorax. Opacities left lung unchanged. Linear ""atelectasis"" right lung  Heart size cannot be accurately assessed in this projection.    Elevated WBC:                        12.2   18.37 )-----------( 526      ( 12 Feb 2022 07:12 )             38.1     A/P;  Considering Atelectasis and Leukocytosis, Incentive Spirometry ordered

## 2022-02-13 NOTE — PROGRESS NOTE ADULT - SUBJECTIVE AND OBJECTIVE BOX
Patient is a 51y old  Female who presents with a chief complaint of 51F p/w sob, abdominal pain and chest pain (12 Feb 2022 10:09)      SUBJECTIVE / OVERNIGHT EVENTS:    Events noted.  CONSTITUTIONAL: No fever,  or fatigue  RESPIRATORY: No cough, wheezing,  No shortness of breath  CARDIOVASCULAR: No chest pain, palpitations, dizziness, or leg swelling  GASTROINTESTINAL: Abd discomfort      MEDICATIONS  (STANDING):  amphetamine/dextroamphetamine XR 20 milliGRAM(s) Oral with breakfast  azithromycin  IVPB 500 milliGRAM(s) IV Intermittent every 24 hours  buPROPion XL (24-Hour) . 300 milliGRAM(s) Oral daily  cefTRIAXone   IVPB 1000 milliGRAM(s) IV Intermittent every 24 hours  HYDROmorphone PCA (1 mG/mL) 30 milliLiter(s) PCA Continuous PCA Continuous  influenza   Vaccine 0.5 milliLiter(s) IntraMuscular once  sodium chloride 0.9%. 1000 milliLiter(s) (50 mL/Hr) IV Continuous <Continuous>    MEDICATIONS  (PRN):  acetaminophen     Tablet .. 650 milliGRAM(s) Oral every 6 hours PRN Temp greater or equal to 38C (100.4F), Mild Pain (1 - 3)  ondansetron Injectable 4 milliGRAM(s) IV Push every 8 hours PRN Nausea and/or Vomiting        CAPILLARY BLOOD GLUCOSE        I&O's Summary    11 Feb 2022 07:01  -  12 Feb 2022 07:00  --------------------------------------------------------  IN: 1280 mL / OUT: 650 mL / NET: 630 mL    12 Feb 2022 07:01  -  12 Feb 2022 20:11  --------------------------------------------------------  IN: 600 mL / OUT: 320 mL / NET: 280 mL        T(C): 36.4 (02-12-22 @ 16:50), Max: 36.7 (02-12-22 @ 13:51)  HR: 98 (02-12-22 @ 16:50) (87 - 98)  BP: 118/74 (02-12-22 @ 16:50) (101/67 - 132/83)  RR: 17 (02-12-22 @ 16:50) (16 - 18)  SpO2: 96% (02-12-22 @ 16:50) (92% - 100%)    PHYSICAL EXAM:    NECK: Supple, No JVD  CHEST/LUNG: Clear to auscultation bilaterally; No wheezing.  HEART: Regular rate and rhythm; No murmurs, rubs, or gallops  ABDOMEN: Soft, Nontender, Nondistended; Bowel sounds present  EXTREMITIES:   No edema  NEUROLOGY: AAO       LABS:                        12.2   18.37 )-----------( 526      ( 12 Feb 2022 07:12 )             38.1     02-12    127<L>  |  90<L>  |  30<H>  ----------------------------<  207<H>  5.2   |  24  |  0.84    Ca    8.4      12 Feb 2022 12:48  Phos  4.0     02-12  Mg     2.4     02-12      PT/INR - ( 11 Feb 2022 07:23 )   PT: 14.1 sec;   INR: 1.18 ratio         PTT - ( 11 Feb 2022 07:23 )  PTT:31.7 sec        CAPILLARY BLOOD GLUCOSE            RADIOLOGY & ADDITIONAL TESTS:    Imaging Personally Reviewed:    Consultant(s) Notes Reviewed:      Care Discussed with Consultants/Other Providers:    Ronak Mcdonnell MD, CMD, FACP    [address] [place] [address]  Office Tel: [phone]  Cell: [phone]

## 2022-02-14 LAB
ANION GAP SERPL CALC-SCNC: 10 MMOL/L — SIGNIFICANT CHANGE UP (ref 5–17)
BUN SERPL-MCNC: 15 MG/DL — SIGNIFICANT CHANGE UP (ref 7–23)
CALCIUM SERPL-MCNC: 8.5 MG/DL — SIGNIFICANT CHANGE UP (ref 8.4–10.5)
CHLORIDE SERPL-SCNC: 95 MMOL/L — LOW (ref 96–108)
CO2 SERPL-SCNC: 24 MMOL/L — SIGNIFICANT CHANGE UP (ref 22–31)
CREAT SERPL-MCNC: 0.48 MG/DL — LOW (ref 0.5–1.3)
GLUCOSE SERPL-MCNC: 162 MG/DL — HIGH (ref 70–99)
HCT VFR BLD CALC: 36 % — SIGNIFICANT CHANGE UP (ref 34.5–45)
HGB BLD-MCNC: 11.4 G/DL — LOW (ref 11.5–15.5)
MCHC RBC-ENTMCNC: 26.7 PG — LOW (ref 27–34)
MCHC RBC-ENTMCNC: 31.7 GM/DL — LOW (ref 32–36)
MCV RBC AUTO: 84.3 FL — SIGNIFICANT CHANGE UP (ref 80–100)
NRBC # BLD: 0 /100 WBCS — SIGNIFICANT CHANGE UP (ref 0–0)
PLATELET # BLD AUTO: 490 K/UL — HIGH (ref 150–400)
POTASSIUM SERPL-MCNC: 4.7 MMOL/L — SIGNIFICANT CHANGE UP (ref 3.5–5.3)
POTASSIUM SERPL-SCNC: 4.7 MMOL/L — SIGNIFICANT CHANGE UP (ref 3.5–5.3)
RBC # BLD: 4.27 M/UL — SIGNIFICANT CHANGE UP (ref 3.8–5.2)
RBC # FLD: 13.3 % — SIGNIFICANT CHANGE UP (ref 10.3–14.5)
SARS-COV-2 RNA SPEC QL NAA+PROBE: SIGNIFICANT CHANGE UP
SODIUM SERPL-SCNC: 129 MMOL/L — LOW (ref 135–145)
WBC # BLD: 10.85 K/UL — HIGH (ref 3.8–10.5)
WBC # FLD AUTO: 10.85 K/UL — HIGH (ref 3.8–10.5)

## 2022-02-14 PROCEDURE — 99255 IP/OBS CONSLTJ NEW/EST HI 80: CPT

## 2022-02-14 PROCEDURE — 71045 X-RAY EXAM CHEST 1 VIEW: CPT | Mod: 26,76

## 2022-02-14 RX ORDER — AMPICILLIN SODIUM AND SULBACTAM SODIUM 250; 125 MG/ML; MG/ML
3 INJECTION, POWDER, FOR SUSPENSION INTRAMUSCULAR; INTRAVENOUS ONCE
Refills: 0 | Status: COMPLETED | OUTPATIENT
Start: 2022-02-14 | End: 2022-02-14

## 2022-02-14 RX ORDER — AMPICILLIN SODIUM AND SULBACTAM SODIUM 250; 125 MG/ML; MG/ML
INJECTION, POWDER, FOR SUSPENSION INTRAMUSCULAR; INTRAVENOUS
Refills: 0 | Status: DISCONTINUED | OUTPATIENT
Start: 2022-02-14 | End: 2022-02-15

## 2022-02-14 RX ORDER — AMPICILLIN SODIUM AND SULBACTAM SODIUM 250; 125 MG/ML; MG/ML
3 INJECTION, POWDER, FOR SUSPENSION INTRAMUSCULAR; INTRAVENOUS EVERY 6 HOURS
Refills: 0 | Status: DISCONTINUED | OUTPATIENT
Start: 2022-02-14 | End: 2022-02-15

## 2022-02-14 RX ORDER — SODIUM CHLORIDE 9 MG/ML
1000 INJECTION INTRAMUSCULAR; INTRAVENOUS; SUBCUTANEOUS
Refills: 0 | Status: DISCONTINUED | OUTPATIENT
Start: 2022-02-14 | End: 2022-02-15

## 2022-02-14 RX ADMIN — BUPROPION HYDROCHLORIDE 300 MILLIGRAM(S): 150 TABLET, EXTENDED RELEASE ORAL at 11:24

## 2022-02-14 RX ADMIN — AMPICILLIN SODIUM AND SULBACTAM SODIUM 200 GRAM(S): 250; 125 INJECTION, POWDER, FOR SUSPENSION INTRAMUSCULAR; INTRAVENOUS at 22:02

## 2022-02-14 RX ADMIN — HYDROMORPHONE HYDROCHLORIDE 30 MILLILITER(S): 2 INJECTION INTRAMUSCULAR; INTRAVENOUS; SUBCUTANEOUS at 19:34

## 2022-02-14 RX ADMIN — SODIUM CHLORIDE 30 MILLILITER(S): 9 INJECTION INTRAMUSCULAR; INTRAVENOUS; SUBCUTANEOUS at 10:28

## 2022-02-14 RX ADMIN — HYDROMORPHONE HYDROCHLORIDE 30 MILLILITER(S): 2 INJECTION INTRAMUSCULAR; INTRAVENOUS; SUBCUTANEOUS at 07:19

## 2022-02-14 RX ADMIN — AMPICILLIN SODIUM AND SULBACTAM SODIUM 200 GRAM(S): 250; 125 INJECTION, POWDER, FOR SUSPENSION INTRAMUSCULAR; INTRAVENOUS at 15:30

## 2022-02-14 RX ADMIN — ONDANSETRON 4 MILLIGRAM(S): 8 TABLET, FILM COATED ORAL at 07:33

## 2022-02-14 NOTE — PROGRESS NOTE ADULT - PROBLEM SELECTOR PLAN 1
Maintain Left chest tube h20  Strict I & Os, document drainage in flowsheets   Daily CXR     Patient encouraged to eat meals OOB to chair   Continue care as per primary team

## 2022-02-14 NOTE — PHYSICAL THERAPY INITIAL EVALUATION ADULT - PERTINENT HX OF CURRENT PROBLEM, REHAB EVAL
51F w/ class III obesity s/p lapband (2010) p/w sob, abdominal pain and chest pain. The patient reports she developed on 2/3/22 abdominal discomfort in LUQ described as gas sensation similar to when she was postop after lapband which was intermittent and mild to moderate severity initially. She then noticed on 2/5/22 that the pain radiated to left chest wall and felt an intermittent chest tightness, restricted deep breath,

## 2022-02-14 NOTE — PHYSICAL THERAPY INITIAL EVALUATION ADULT - ADDITIONAL COMMENTS
Pt lives with sisters in 2-family home; 3 steps to enter in front, full flight to second floor apartment. Prior to admission pt was independent with all functional mobility; did not use an AD; +working.

## 2022-02-14 NOTE — PHYSICAL THERAPY INITIAL EVALUATION ADULT - PRECAUTIONS/LIMITATIONS, REHAB EVAL
On ROS, the patient does reports fever and chills during this time but denies cough, hemoptysis, vomiting, diarrhea, palpitations, or painful urination. The patient denies sick contacts, hx of hospitalization, cigarette smoking, family history of lung disease, weight loss, night sweats, or travel outside of [place]. The patient significantly has not followed with a primary care doctor and has not completed age appropriate screening with mammography, pap smear or colonoscopy. CTAbdomen: Redemonstrated left pleural effusion with adjacent compressive atelectasis. Superimposed pneumonia cannot be excluded. Correlate clinically.Gastric lap band demonstrates O configuration, but there is no evidence of slippage.Presence of enteric contrast in the mildly distended visualized esophagus, may be seen in the setting of slow transit versus reflux. Correlate clinically. CTChest: Large loculated left pleural effusion with near complete left lung collapse.Dilated esophagus with air-fluid level and residual enteric contrast from earlier same day abdominal CT, may be related to slow transit in the setting of a gastric band. 2/13: CXR: Left chest tubes again noted. No pneumothorax. Opacities left lung unchanged. Linear atelectasis right lung Heart size cannot be accurately assessed in this projection./fall precautions/oxygen therapy device and L/min/surgical precautions

## 2022-02-14 NOTE — PROGRESS NOTE ADULT - SUBJECTIVE AND OBJECTIVE BOX
Day 3 of Anesthesia Pain Management Service    SUBJECTIVE: I'm doing ok    Pain Scale Score:	[X] Refer to charted pain scores    THERAPY:    [ ] IV PCA Morphine		[ ] 5 mg/mL	[ ] 1 mg/mL  [X] IV PCA Hydromorphone	[ ] 5 mg/mL	[X] 1 mg/mL  [ ] IV PCA Fentanyl		[ ] 50 micrograms/mL    Demand dose: 0.2 mg     Lockout: 6 minutes   Continuous Rate: 0 mg/hr  4 Hour Limit: 4 mg    MEDICATIONS  (STANDING):  amphetamine/dextroamphetamine XR 20 milliGRAM(s) Oral with breakfast  azithromycin  IVPB 500 milliGRAM(s) IV Intermittent every 24 hours  buPROPion XL (24-Hour) . 300 milliGRAM(s) Oral daily  cefTRIAXone   IVPB 1000 milliGRAM(s) IV Intermittent every 24 hours  HYDROmorphone PCA (1 mG/mL) 30 milliLiter(s) PCA Continuous PCA Continuous  influenza   Vaccine 0.5 milliLiter(s) IntraMuscular once  sodium chloride 0.9%. 1000 milliLiter(s) (30 mL/Hr) IV Continuous <Continuous>    MEDICATIONS  (PRN):  acetaminophen     Tablet .. 650 milliGRAM(s) Oral every 6 hours PRN Temp greater or equal to 38C (100.4F), Mild Pain (1 - 3)  ondansetron Injectable 4 milliGRAM(s) IV Push every 8 hours PRN Nausea and/or Vomiting      OBJECTIVE:    Sedation Score:	[ X] Alert 	[ ] Drowsy 	[ ] Arousable	[ ] Asleep	[ ] Unresponsive    Side Effects:	[X ] None	[ ] Nausea	[ ] Vomiting	[ ] Pruritus  		[ ] Other:    Vital Signs Last 24 Hrs  T(C): 36.7 (14 Feb 2022 04:12), Max: 36.8 (13 Feb 2022 22:02)  T(F): 98 (14 Feb 2022 04:12), Max: 98.2 (13 Feb 2022 22:02)  HR: 88 (14 Feb 2022 04:12) (88 - 101)  BP: 125/80 (14 Feb 2022 04:12) (109/69 - 125/80)  BP(mean): --  RR: 16 (14 Feb 2022 04:12) (16 - 18)  SpO2: 96% (14 Feb 2022 04:12) (94% - 98%)    ASSESSMENT/ PLAN    Therapy to  be:               [X] Continued   [ ] Discontinued   [ ] Changed to PRN Analgesics    Documentation and Verification of current medications:   [X] Done	[ ] Not done, not eligible    Comments: Endorsing good analgesia, PCA use minimal 0-1x/hr. Consider transition to prn analgesics today.

## 2022-02-14 NOTE — CONSULT NOTE ADULT - SUBJECTIVE AND OBJECTIVE BOX
HPI:  51F w/ class III obesity s/p lapband (2010) p/w sob, abdominal pain and chest pain. The patient reports she developed on 2/3/22 abdominal discomfort in LUQ described as gas sensation similar to when she was postop after lapband which was intermittent and mild to moderate severity initially. She then noticed on 2/5/22 that the pain radiated to left chest wall and felt an intermittent chest tightness, restricted deep breath, 10/10 severe at times, and associated w/ sob. The discomfort and sob occurred at risk and progressed leading the patient to seek medical attention. On ROS, the patient does reports fever and chills during this time but denies cough, hemoptysis, vomiting, diarrhea, palpitations, or painful urination. The patient denies sick contacts, hx of hospitalization, cigarette smoking, family history of lung disease, weight loss, night sweats, or travel outside of [place]. The patient significantly has not followed with a primary care doctor and has not completed age appropriate screening with mammography, pap smear or colonoscopy. (07 Feb 2022 23:02)      PAST MEDICAL & SURGICAL HISTORY:  Anxiety and depression    ADHD (attention deficit hyperactivity disorder)    Class 3 severe obesity in adult    H/O laparoscopic adjustable gastric banding        Allergies    No Known Allergies    Intolerances        ANTIMICROBIALS:  azithromycin  IVPB 500 every 24 hours  cefTRIAXone   IVPB 1000 every 24 hours      OTHER MEDS:  acetaminophen     Tablet .. 650 milliGRAM(s) Oral every 6 hours PRN  amphetamine/dextroamphetamine XR 20 milliGRAM(s) Oral with breakfast  buPROPion XL (24-Hour) . 300 milliGRAM(s) Oral daily  HYDROmorphone PCA (1 mG/mL) 30 milliLiter(s) PCA Continuous PCA Continuous  influenza   Vaccine 0.5 milliLiter(s) IntraMuscular once  ondansetron Injectable 4 milliGRAM(s) IV Push every 8 hours PRN  sodium chloride 0.9%. 1000 milliLiter(s) IV Continuous <Continuous>      SOCIAL HISTORY:    FAMILY HISTORY:  No pertinent family history in first degree relatives        ROS:  Unobtainable because:   All other systems negative   Constitutional: no fever, no chills  Eye: no vision changes  ENT: no sore throat, no rhinorrhea  Cardiovascular: no chest pain, no palpitation  Respiratory: no SOB, no cough  GI:  no abd pain, no vomiting, no diarrhea  urinary: no dysuria, no hematuria, no flank pain  musculoskeletal: no joint pain, no joint swelling  skin: no rash  neurology: no headache, no change in mental status  psych: no anxiety    Physical Exam:  General: awake, alert, non toxic  Head: atraumatic, normocephalic  Eyes: normal sclera and conjunctiva  ENT: no oropharyngeal lesions, no LAD, neck supple  Cardio: regular rate and rhythm   Respiratory: nonlabored on room air, clear bilaterally, no wheezing  abd: soft, bowel sounds present, not tender  : no suprapubic tenderness, no alas  Musculoskeletal: no joint swelling, no edema  Skin: no rash  vascular: no phlebitis  Neurologic: no focal deficits  psych: normal affect       Drug Dosing Weight    Weight (kg): 149.7 (11 Feb 2022 07:27)    Vital Signs Last 24 Hrs  T(F): 98.2 (02-14-22 @ 10:54), Max: 100.2 (02-08-22 @ 20:56)    Vital Signs Last 24 Hrs  HR: 99 (02-14-22 @ 10:54) (87 - 100)  BP: 107/71 (02-14-22 @ 10:54) (102/68 - 125/80)  RR: 18 (02-14-22 @ 10:54)  SpO2: 95% (02-14-22 @ 10:54) (95% - 98%)  Wt(kg): --                          11.4   10.85 )-----------( 490      ( 14 Feb 2022 06:16 )             36.0       02-14    129<L>  |  95<L>  |  15  ----------------------------<  162<H>  4.7   |  24  |  0.48<L>    Ca    8.5      14 Feb 2022 06:16            MICROBIOLOGY:  Culture - Fungal, Body Fluid (collected 02-11-22 @ 17:26)  Source: .Body Fluid #2 pleural effusion  Preliminary Report (02-14-22 @ 09:29):    Testing in progress    Culture - Body Fluid with Gram Stain (collected 02-11-22 @ 17:26)  Source: .Body Fluid Pleural Fluid  Gram Stain (02-11-22 @ 22:05):    Numerous polymorphonuclear leukocytes per low power field    No organisms seen  Preliminary Report (02-12-22 @ 11:08):    No growth    Culture - Acid Fast - Body Fluid w/Smear (collected 02-11-22 @ 17:26)  Source: .Body Fluid Pleural Fluid    Culture - Acid Fast - Tissue w/Smear (collected 02-11-22 @ 17:26)  Source: .Tissue Other    Culture - Fungal, Tissue (collected 02-11-22 @ 17:26)  Source: .Tissue #1 upper lobe pleural peel  Preliminary Report (02-14-22 @ 09:27):    Testing in progress    Culture - Tissue with Gram Stain (collected 02-11-22 @ 17:26)  Source: .Tissue Other  Gram Stain (02-11-22 @ 22:05):    Few polymorphonuclear leukocytes per low power field    No organisms seen  Preliminary Report (02-12-22 @ 12:21):    No growth    Culture - Tissue with Gram Stain (collected 02-11-22 @ 17:24)  Source: .Tissue Other  Gram Stain (02-11-22 @ 22:41):    No polymorphonuclear leukocytes    No organisms seen per oil power field  Preliminary Report (02-12-22 @ 12:37):    No growth    Culture - Fungal, Tissue (collected 02-11-22 @ 17:24)  Source: .Tissue #3 upper lobe pleural peel  Preliminary Report (02-14-22 @ 09:28):    Testing in progress    Culture - Acid Fast - Tissue w/Smear (collected 02-11-22 @ 17:24)  Source: .Tissue Other    Culture - Body Fluid with Gram Stain (collected 02-08-22 @ 03:44)  Source: .Body Fluid Pleural Fluid  Gram Stain (02-08-22 @ 06:32):    polymorphonuclear leukocytes seen    No organisms seen    by cytocentrifuge  Final Report (02-13-22 @ 12:21):    Rare Streptococcus intermedius  Organism: Streptococcus intermedius (02-13-22 @ 12:21)  Organism: Streptococcus intermedius (02-13-22 @ 12:21)      -  Ceftriaxone: S 0.125      -  Penicillin: S 0.032      Method Type: ETEST  Organism: Streptococcus intermedius (02-13-22 @ 12:21)      -  Clindamycin: S      -  Erythromycin: S      -  Vancomycin: S      Method Type: KB    Culture - Blood (collected 02-07-22 @ 18:57)  Source: .Blood Blood-Peripheral  Final Report (02-12-22 @ 19:01):    No Growth Final    Culture - Blood (collected 02-07-22 @ 18:57)  Source: .Blood Blood-Peripheral  Final Report (02-12-22 @ 19:01):    No Growth Final    RADIOLOGY:  Images below reviewed personally  Xray Chest 1 View- PORTABLE-Routine (Xray Chest 1 View- PORTABLE-Routine in AM.) (02.13.22 @ 08:44)   Left chest tubes again noted. No pneumothorax. Opacities left lung   unchanged. Linear atelectasis right lung  Heart size cannot be accurately assessed in this projection.   HPI:   51F with obesity s/p lap band 2010.   Developed dyspnea on exertion within the past few weeks followed by left sided abdominal/flank pain since around 2/3.   She thought it was related to her lap band.   Admitted 2/7/22 with left loculated effusion, exudative s/p chest tube and then VATs 2/11.   No fevers or chills.   She did have a "cold" around [place] but this went away on its own.   Denies trauma.   Vaccinated for COVID Moderna x2, 2/2021.   Feels much better today.   One chest tube was removed.     PAST MEDICAL & SURGICAL HISTORY:  Anxiety and depression    ADHD (attention deficit hyperactivity disorder)    Class 3 severe obesity in adult    H/O laparoscopic adjustable gastric banding        Allergies    No Known Allergies    Intolerances        ANTIMICROBIALS:  azithromycin  IVPB 500 every 24 hours  cefTRIAXone   IVPB 1000 every 24 hours      OTHER MEDS:  acetaminophen     Tablet .. 650 milliGRAM(s) Oral every 6 hours PRN  amphetamine/dextroamphetamine XR 20 milliGRAM(s) Oral with breakfast  buPROPion XL (24-Hour) . 300 milliGRAM(s) Oral daily  HYDROmorphone PCA (1 mG/mL) 30 milliLiter(s) PCA Continuous PCA Continuous  influenza   Vaccine 0.5 milliLiter(s) IntraMuscular once  ondansetron Injectable 4 milliGRAM(s) IV Push every 8 hours PRN  sodium chloride 0.9%. 1000 milliLiter(s) IV Continuous <Continuous>      SOCIAL HISTORY: Nonsmoker     FAMILY HISTORY:  Mother with HTN       ROS:  All other systems negative   Constitutional: no fever, no chills  Eye: no vision changes  ENT: no sore throat, no rhinorrhea  Cardiovascular: no palpitation  Respiratory: per HPI   GI:  no vomiting, no diarrhea  urinary: no dysuria, no hematuria, no flank pain  musculoskeletal: no joint pain, no joint swelling  skin: no rash  neurology: no headache, no change in mental status  psych: no anxiety    Physical Exam:  General: awake, alert, non toxic, obese   Head: atraumatic, normocephalic  Eyes: normal sclera and conjunctiva  ENT: neck supple  Cardio: regular rate and rhythm   Respiratory: left sided chest tube. nonlabored on nasal cannula. left sided tubes. no wheezing  abd: soft, bowel sounds present, not tender  : no alas  Musculoskeletal: no joint swelling, no edema  Skin: no rash  vascular: no phlebitis  Neurologic: no focal deficits  psych: normal affect       Drug Dosing Weight    Weight (kg): 149.7 (11 Feb 2022 07:27)    Vital Signs Last 24 Hrs  T(F): 98.2 (02-14-22 @ 10:54), Max: 100.2 (02-08-22 @ 20:56)    Vital Signs Last 24 Hrs  HR: 99 (02-14-22 @ 10:54) (87 - 100)  BP: 107/71 (02-14-22 @ 10:54) (102/68 - 125/80)  RR: 18 (02-14-22 @ 10:54)  SpO2: 95% (02-14-22 @ 10:54) (95% - 98%)  Wt(kg): --                          11.4   10.85 )-----------( 490      ( 14 Feb 2022 06:16 )             36.0       02-14    129<L>  |  95<L>  |  15  ----------------------------<  162<H>  4.7   |  24  |  0.48<L>    Ca    8.5      14 Feb 2022 06:16            MICROBIOLOGY:  Culture - Fungal, Body Fluid (collected 02-11-22 @ 17:26)  Source: .Body Fluid #2 pleural effusion  Preliminary Report (02-14-22 @ 09:29):    Testing in progress    Culture - Body Fluid with Gram Stain (collected 02-11-22 @ 17:26)  Source: .Body Fluid Pleural Fluid  Gram Stain (02-11-22 @ 22:05):    Numerous polymorphonuclear leukocytes per low power field    No organisms seen  Preliminary Report (02-12-22 @ 11:08):    No growth    Culture - Acid Fast - Body Fluid w/Smear (collected 02-11-22 @ 17:26)  Source: .Body Fluid Pleural Fluid    Culture - Acid Fast - Tissue w/Smear (collected 02-11-22 @ 17:26)  Source: .Tissue Other    Culture - Fungal, Tissue (collected 02-11-22 @ 17:26)  Source: .Tissue #1 upper lobe pleural peel  Preliminary Report (02-14-22 @ 09:27):    Testing in progress    Culture - Tissue with Gram Stain (collected 02-11-22 @ 17:26)  Source: .Tissue Other  Gram Stain (02-11-22 @ 22:05):    Few polymorphonuclear leukocytes per low power field    No organisms seen  Preliminary Report (02-12-22 @ 12:21):    No growth    Culture - Tissue with Gram Stain (collected 02-11-22 @ 17:24)  Source: .Tissue Other  Gram Stain (02-11-22 @ 22:41):    No polymorphonuclear leukocytes    No organisms seen per oil power field  Preliminary Report (02-12-22 @ 12:37):    No growth    Culture - Fungal, Tissue (collected 02-11-22 @ 17:24)  Source: .Tissue #3 upper lobe pleural peel  Preliminary Report (02-14-22 @ 09:28):    Testing in progress    Culture - Acid Fast - Tissue w/Smear (collected 02-11-22 @ 17:24)  Source: .Tissue Other    Culture - Body Fluid with Gram Stain (collected 02-08-22 @ 03:44)  Source: .Body Fluid Pleural Fluid  Gram Stain (02-08-22 @ 06:32):    polymorphonuclear leukocytes seen    No organisms seen    by cytocentrifuge  Final Report (02-13-22 @ 12:21):    Rare Streptococcus intermedius  Organism: Streptococcus intermedius (02-13-22 @ 12:21)  Organism: Streptococcus intermedius (02-13-22 @ 12:21)      -  Ceftriaxone: S 0.125      -  Penicillin: S 0.032      Method Type: ETEST  Organism: Streptococcus intermedius (02-13-22 @ 12:21)      -  Clindamycin: S      -  Erythromycin: S      -  Vancomycin: S      Method Type: KB    Culture - Blood (collected 02-07-22 @ 18:57)  Source: .Blood Blood-Peripheral  Final Report (02-12-22 @ 19:01):    No Growth Final    Culture - Blood (collected 02-07-22 @ 18:57)  Source: .Blood Blood-Peripheral  Final Report (02-12-22 @ 19:01):    No Growth Final    RADIOLOGY:  Images below reviewed personally  Xray Chest 1 View- PORTABLE-Routine (Xray Chest 1 View- PORTABLE-Routine in AM.) (02.13.22 @ 08:44)   Left chest tubes again noted. No pneumothorax. Opacities left lung   unchanged. Linear atelectasis right lung  Heart size cannot be accurately assessed in this projection.

## 2022-02-14 NOTE — PROGRESS NOTE ADULT - ASSESSMENT
51F w/ PMHx of depression, obesity s/p lap band by Dr. Acevedo in 2010 who presents to the ED with a 4 day history of vague epigastric/LUQ pain that radiated to the left chest and scapula. Patient refers having poor PO intake, some nausea but no vomiting, passing gas and having BMs.     In the ED, patient was afebrile, tachycardic to 110s, normotensive, labs remarkable for leukocytosis of 19k, otherwise WNL. CTAP revealed oral contrast revealed adequate lap band placement, contrast in stomach, small bowel, and esophagus. Also, large left pleural effusion is partially appreciated. Thoracic surgery consulted for evaluation of pleural effusion.    2/8 VSS; patient is comfortable on 2L NC. L PTC - LWS in place   2/9 VSS, plan for intrapleural TPA via left pigtail today, hold anticoagulation.  2/10 VSS, continue intrapleural TPA, keep NPO p MN for possible VATS decort Friday.  2/11 NPO Left VATs decortication today.   2/12 maintain chest tubes to LWS, PCA pump, CXR negative for ptx. OR cultures pending   2/13  Will maintain chest tubes to LWS.  Pain well controlled on PCA pump.  Encouraged to eat meals OOB to recliner.    2/14   lt post tube pulled   tolerated well    vvv   PT seeing pt   OOB to chair  tube to water seal

## 2022-02-14 NOTE — PROGRESS NOTE ADULT - ASSESSMENT
51F w/ class III obesity s/p miguel (2010) p/w sob, abdominal pain and chest pain found to have left-sided loculated pleural effusion with sepsis    Loculated pleural effusion:    Chest tube +  S/p VATs   Thoracic Sx f/up appreciated  IV Zosyn  ID eval appreciated

## 2022-02-14 NOTE — PROGRESS NOTE ADULT - SUBJECTIVE AND OBJECTIVE BOX
VITAL SIG    Vital Signs Last 24 Hrs  T(C): 36.7 (22 @ 04:12), Max: 36.8 (22 @ 22:02)  T(F): 98 (22 @ 04:12), Max: 98.2 (22 @ 22:02)  HR: 90 (22 @ 09:25) (88 - 101)  BP: 115/75 (22 @ 09:25) (109/69 - 125/80)  RR: 16 (22 @ 04:12) (16 - 18)  SpO2: 97% (22 @ 09:25) (94% - 98%)                   Daily     Daily Weight in k.4 (2022 08:40)        CAPILLARY BLOOD GLUCOSE              Drains:        L Pleural  [  ]  Drainage:                                 PHYSICAL EXAM    Neurology: alert and oriented x 3, moves all extremities with no defecits  CV :  RRR  Sternal Wound :  CDI , Stable  Lungs:   CTA B/L  Abdomen: soft, nontender, nondistended, positive bowel sounds,  Extremities:     no edema  no calf tenderness

## 2022-02-14 NOTE — PROGRESS NOTE ADULT - SUBJECTIVE AND OBJECTIVE BOX
Patient is a 51y old  Female who presents with a chief complaint of 51F p/w sob, abdominal pain and chest pain (14 Feb 2022 13:25)      SUBJECTIVE / OVERNIGHT EVENTS:    Events noted.  CONSTITUTIONAL: No fever,  or fatigue  RESPIRATORY: No cough, wheezing,  No shortness of breath  CARDIOVASCULAR: No chest pain, palpitations, dizziness, or leg swelling  GASTROINTESTINAL: No abdominal or epigastric pain.   NEUROLOGICAL: No headache    MEDICATIONS  (STANDING):  amphetamine/dextroamphetamine XR 20 milliGRAM(s) Oral with breakfast  ampicillin/sulbactam  IVPB      ampicillin/sulbactam  IVPB 3 Gram(s) IV Intermittent every 6 hours  buPROPion XL (24-Hour) . 300 milliGRAM(s) Oral daily  HYDROmorphone PCA (1 mG/mL) 30 milliLiter(s) PCA Continuous PCA Continuous  influenza   Vaccine 0.5 milliLiter(s) IntraMuscular once  sodium chloride 0.9%. 1000 milliLiter(s) (30 mL/Hr) IV Continuous <Continuous>    MEDICATIONS  (PRN):  acetaminophen     Tablet .. 650 milliGRAM(s) Oral every 6 hours PRN Temp greater or equal to 38C (100.4F), Mild Pain (1 - 3)  ondansetron Injectable 4 milliGRAM(s) IV Push every 8 hours PRN Nausea and/or Vomiting        CAPILLARY BLOOD GLUCOSE        I&O's Summary    13 Feb 2022 07:01  -  14 Feb 2022 07:00  --------------------------------------------------------  IN: 1000 mL / OUT: 650 mL / NET: 350 mL    14 Feb 2022 07:01  -  14 Feb 2022 22:25  --------------------------------------------------------  IN: 1040 mL / OUT: 400 mL / NET: 640 mL        T(C): 36.6 (02-14-22 @ 20:54), Max: 36.9 (02-14-22 @ 15:30)  HR: 99 (02-14-22 @ 20:54) (87 - 99)  BP: 129/83 (02-14-22 @ 20:54) (102/68 - 129/83)  RR: 18 (02-14-22 @ 20:54) (16 - 18)  SpO2: 89% (02-14-22 @ 20:54) (89% - 99%)    PHYSICAL EXAM:  GENERAL: NAD  NECK: Supple, No JVD  CHEST/LUNG: Clear to auscultation bilaterally; No wheezing.  HEART: Regular rate and rhythm; No murmurs, rubs, or gallops  ABDOMEN: Soft, Nontender, Nondistended; Bowel sounds present  EXTREMITIES:   No edema  NEUROLOGY: AAO X 3      LABS:                        11.4   10.85 )-----------( 490      ( 14 Feb 2022 06:16 )             36.0     02-14    129<L>  |  95<L>  |  15  ----------------------------<  162<H>  4.7   |  24  |  0.48<L>    Ca    8.5      14 Feb 2022 06:16              CAPILLARY BLOOD GLUCOSE            RADIOLOGY & ADDITIONAL TESTS:    Imaging Personally Reviewed:    Consultant(s) Notes Reviewed:      Care Discussed with Consultants/Other Providers:    Ronak Mcdonnell MD, CMD, FACP    [address] [place] [address]  Office Tel: [phone]  Cell: [phone]

## 2022-02-14 NOTE — PHYSICAL THERAPY INITIAL EVALUATION ADULT - GENERAL OBSERVATIONS, REHAB EVAL
pt received semi-supine in bed +PCA pump, IV, chest tube, venodynes, prima-fit, 2L O2 NC, tele monitor, motivated to work with PT

## 2022-02-14 NOTE — PROGRESS NOTE ADULT - ATTENDING COMMENTS
Pain Management Attending Addendum    SUBJECTIVE: Patient doing well with IV PCA    Therapy:    [X] IV PCA         [ ] PRN Analgesics    OBJECTIVE:   [X] Pain appropriately controlled    [ ] Other:    Side Effects:  [X] None	             [ ] Nausea              [ ] Pruritis                	[ ] Other:    ASSESSMENT/PLAN:  Therapy changed to PRN analgesics    Comments:
I saw and examined the patient, and reviewed  the history with the patient and   Agree with note which was also reviewed and edited where appropriate.  D/W patient, RN, residents and Fellow
I saw and examined the patient, and reviewed  the history and data with the patient and staff  Agree with note which was also reviewed and edited where appropriate.  as long as no dysphagia, no further surgical intervention at this time.  left chest and overall care per primary service.  D/W patient, RN, residents and Fellow
patient with approx. 300cc of serous drainage from left chest with still large effusion, pigtail to be pulled back, also has poor po intake, underwent lap band adjustment by IR.   will plan for TPA if cxr not improved by AM. if fails MIST - will need vats/possible open decort.   explained to patient - who is agreeable.
tentatively plan for OR friday if no improvement after TPA doses.

## 2022-02-15 LAB
ANION GAP SERPL CALC-SCNC: 10 MMOL/L — SIGNIFICANT CHANGE UP (ref 5–17)
BUN SERPL-MCNC: 14 MG/DL — SIGNIFICANT CHANGE UP (ref 7–23)
CALCIUM SERPL-MCNC: 7.5 MG/DL — LOW (ref 8.4–10.5)
CHLORIDE SERPL-SCNC: 99 MMOL/L — SIGNIFICANT CHANGE UP (ref 96–108)
CO2 SERPL-SCNC: 22 MMOL/L — SIGNIFICANT CHANGE UP (ref 22–31)
CREAT SERPL-MCNC: 0.46 MG/DL — LOW (ref 0.5–1.3)
GLUCOSE SERPL-MCNC: 157 MG/DL — HIGH (ref 70–99)
HCT VFR BLD CALC: 34.1 % — LOW (ref 34.5–45)
HGB BLD-MCNC: 10.5 G/DL — LOW (ref 11.5–15.5)
MCHC RBC-ENTMCNC: 26.5 PG — LOW (ref 27–34)
MCHC RBC-ENTMCNC: 30.8 GM/DL — LOW (ref 32–36)
MCV RBC AUTO: 86.1 FL — SIGNIFICANT CHANGE UP (ref 80–100)
NRBC # BLD: 0 /100 WBCS — SIGNIFICANT CHANGE UP (ref 0–0)
PLATELET # BLD AUTO: 494 K/UL — HIGH (ref 150–400)
POTASSIUM SERPL-MCNC: 3.9 MMOL/L — SIGNIFICANT CHANGE UP (ref 3.5–5.3)
POTASSIUM SERPL-SCNC: 3.9 MMOL/L — SIGNIFICANT CHANGE UP (ref 3.5–5.3)
RBC # BLD: 3.96 M/UL — SIGNIFICANT CHANGE UP (ref 3.8–5.2)
RBC # FLD: 13.4 % — SIGNIFICANT CHANGE UP (ref 10.3–14.5)
SODIUM SERPL-SCNC: 131 MMOL/L — LOW (ref 135–145)
WBC # BLD: 12.67 K/UL — HIGH (ref 3.8–10.5)
WBC # FLD AUTO: 12.67 K/UL — HIGH (ref 3.8–10.5)

## 2022-02-15 PROCEDURE — 93010 ELECTROCARDIOGRAM REPORT: CPT

## 2022-02-15 PROCEDURE — 71045 X-RAY EXAM CHEST 1 VIEW: CPT | Mod: 26

## 2022-02-15 PROCEDURE — 99232 SBSQ HOSP IP/OBS MODERATE 35: CPT

## 2022-02-15 RX ORDER — OXYCODONE AND ACETAMINOPHEN 5; 325 MG/1; MG/1
1 TABLET ORAL EVERY 6 HOURS
Refills: 0 | Status: DISCONTINUED | OUTPATIENT
Start: 2022-02-15 | End: 2022-02-17

## 2022-02-15 RX ORDER — AMPICILLIN SODIUM AND SULBACTAM SODIUM 250; 125 MG/ML; MG/ML
3 INJECTION, POWDER, FOR SUSPENSION INTRAMUSCULAR; INTRAVENOUS EVERY 6 HOURS
Refills: 0 | Status: DISCONTINUED | OUTPATIENT
Start: 2022-02-15 | End: 2022-02-17

## 2022-02-15 RX ORDER — HYDROMORPHONE HYDROCHLORIDE 2 MG/ML
30 INJECTION INTRAMUSCULAR; INTRAVENOUS; SUBCUTANEOUS
Refills: 0 | Status: DISCONTINUED | OUTPATIENT
Start: 2022-02-15 | End: 2022-02-15

## 2022-02-15 RX ORDER — OXYCODONE AND ACETAMINOPHEN 5; 325 MG/1; MG/1
2 TABLET ORAL EVERY 6 HOURS
Refills: 0 | Status: DISCONTINUED | OUTPATIENT
Start: 2022-02-15 | End: 2022-02-17

## 2022-02-15 RX ADMIN — OXYCODONE AND ACETAMINOPHEN 1 TABLET(S): 5; 325 TABLET ORAL at 21:14

## 2022-02-15 RX ADMIN — AMPICILLIN SODIUM AND SULBACTAM SODIUM 200 GRAM(S): 250; 125 INJECTION, POWDER, FOR SUSPENSION INTRAMUSCULAR; INTRAVENOUS at 09:27

## 2022-02-15 RX ADMIN — AMPICILLIN SODIUM AND SULBACTAM SODIUM 200 GRAM(S): 250; 125 INJECTION, POWDER, FOR SUSPENSION INTRAMUSCULAR; INTRAVENOUS at 15:25

## 2022-02-15 RX ADMIN — OXYCODONE AND ACETAMINOPHEN 2 TABLET(S): 5; 325 TABLET ORAL at 14:11

## 2022-02-15 RX ADMIN — OXYCODONE AND ACETAMINOPHEN 1 TABLET(S): 5; 325 TABLET ORAL at 19:25

## 2022-02-15 RX ADMIN — AMPICILLIN SODIUM AND SULBACTAM SODIUM 200 GRAM(S): 250; 125 INJECTION, POWDER, FOR SUSPENSION INTRAMUSCULAR; INTRAVENOUS at 02:59

## 2022-02-15 RX ADMIN — BUPROPION HYDROCHLORIDE 300 MILLIGRAM(S): 150 TABLET, EXTENDED RELEASE ORAL at 11:00

## 2022-02-15 RX ADMIN — AMPICILLIN SODIUM AND SULBACTAM SODIUM 200 GRAM(S): 250; 125 INJECTION, POWDER, FOR SUSPENSION INTRAMUSCULAR; INTRAVENOUS at 21:06

## 2022-02-15 RX ADMIN — ONDANSETRON 4 MILLIGRAM(S): 8 TABLET, FILM COATED ORAL at 03:42

## 2022-02-15 RX ADMIN — OXYCODONE AND ACETAMINOPHEN 2 TABLET(S): 5; 325 TABLET ORAL at 12:16

## 2022-02-15 RX ADMIN — ONDANSETRON 4 MILLIGRAM(S): 8 TABLET, FILM COATED ORAL at 10:41

## 2022-02-15 RX ADMIN — HYDROMORPHONE HYDROCHLORIDE 30 MILLILITER(S): 2 INJECTION INTRAMUSCULAR; INTRAVENOUS; SUBCUTANEOUS at 06:58

## 2022-02-15 NOTE — PROGRESS NOTE ADULT - SUBJECTIVE AND OBJECTIVE BOX
Patient is a 51y old  Female who presents with a chief complaint of sp   rt dalia conrad (15 Feb 2022 13:38)      SUBJECTIVE / OVERNIGHT EVENTS:    Events noted.  CONSTITUTIONAL: No fever,  or fatigue  RESPIRATORY: No cough, wheezing,  No shortness of breath  CARDIOVASCULAR: No chest pain, palpitations, dizziness, or leg swelling  GASTROINTESTINAL: No abdominal or epigastric pain. No nausea, vomiting.  NEUROLOGICAL: No headache    MEDICATIONS  (STANDING):  amphetamine/dextroamphetamine XR 20 milliGRAM(s) Oral with breakfast  ampicillin/sulbactam  IVPB 3 Gram(s) IV Intermittent every 6 hours  buPROPion XL (24-Hour) . 300 milliGRAM(s) Oral daily  influenza   Vaccine 0.5 milliLiter(s) IntraMuscular once    MEDICATIONS  (PRN):  acetaminophen     Tablet .. 650 milliGRAM(s) Oral every 6 hours PRN Temp greater or equal to 38C (100.4F), Mild Pain (1 - 3)  ondansetron Injectable 4 milliGRAM(s) IV Push every 8 hours PRN Nausea and/or Vomiting  oxycodone    5 mG/acetaminophen 325 mG 1 Tablet(s) Oral every 6 hours PRN Moderate Pain (4 - 6)  oxycodone    5 mG/acetaminophen 325 mG 2 Tablet(s) Oral every 6 hours PRN Severe Pain (7 - 10)        CAPILLARY BLOOD GLUCOSE        I&O's Summary    14 Feb 2022 07:01  -  15 Feb 2022 07:00  --------------------------------------------------------  IN: 1040 mL / OUT: 400 mL / NET: 640 mL    15 Feb 2022 07:01  -  15 Feb 2022 22:31  --------------------------------------------------------  IN: 0 mL / OUT: 900 mL / NET: -900 mL        T(C): 36.8 (02-15-22 @ 20:18), Max: 36.8 (02-15-22 @ 20:18)  HR: 91 (02-15-22 @ 20:18) (85 - 91)  BP: 129/75 (02-15-22 @ 20:18) (128/76 - 156/67)  RR: 18 (02-15-22 @ 20:18) (17 - 18)  SpO2: 96% (02-15-22 @ 20:18) (93% - 96%)    PHYSICAL EXAM:  GENERAL: NAD  NECK: Supple, No JVD  CHEST/LUNG: Clear to auscultation bilaterally; No wheezing.  HEART: Regular rate and rhythm; No murmurs, rubs, or gallops  ABDOMEN: Soft, Nontender, Nondistended; Bowel sounds present  EXTREMITIES:   No edema  NEUROLOGY: AAO X 3      LABS:                        10.5   12.67 )-----------( 494      ( 15 Feb 2022 05:57 )             34.1     02-15    131<L>  |  99  |  14  ----------------------------<  157<H>  3.9   |  22  |  0.46<L>    Ca    7.5<L>      15 Feb 2022 05:57              CAPILLARY BLOOD GLUCOSE            RADIOLOGY & ADDITIONAL TESTS:    Imaging Personally Reviewed:    Consultant(s) Notes Reviewed:      Care Discussed with Consultants/Other Providers:    Ronak Mcdonnell MD, CMD, FACP    [address] [place] [address]  Office Tel: [phone]  Cell: [phone]

## 2022-02-15 NOTE — PROGRESS NOTE ADULT - SUBJECTIVE AND OBJECTIVE BOX
VITAL SIGN    Vital Signs Last 24 Hrs  T(C): 36.4 (02-15-22 @ 11:27), Max: 36.9 (22 @ 15:30)  T(F): 97.6 (02-15-22 @ :27), Max: 98.4 (22 @ 15:30)  HR: 85 (02-15-22 @ 11:) (85 - 99)  BP: 156/67 (02-15-22 @ 11:) (105/74 - 156/67)  RR: 18 (02-15-22 @ :) (17 - 18)  SpO2: 95% (02-15-22 @ :27) (89% - 99%)                   Daily     Daily Weight in k.8 (15 Feb 2022 07:30)        CAPILLARY BLOOD GLUCOSE              Drains:    out                    PHYSICAL EXAM    Neurology: alert and oriented x 3, moves all extremities with no defecits  CV :  RRR    Lungs:      rt side diminished  Abdomen: soft, nontender, nondistended, positive bowel sounds,   Extremities:     no pedal edema

## 2022-02-15 NOTE — PROGRESS NOTE ADULT - SUBJECTIVE AND OBJECTIVE BOX
Follow Up:      Interval History/ROS:     Allergies  No Known Allergies        ANTIMICROBIALS:      OTHER MEDS:  acetaminophen     Tablet .. 650 milliGRAM(s) Oral every 6 hours PRN  amphetamine/dextroamphetamine XR 20 milliGRAM(s) Oral with breakfast  buPROPion XL (24-Hour) . 300 milliGRAM(s) Oral daily  influenza   Vaccine 0.5 milliLiter(s) IntraMuscular once  ondansetron Injectable 4 milliGRAM(s) IV Push every 8 hours PRN  oxycodone    5 mG/acetaminophen 325 mG 1 Tablet(s) Oral every 6 hours PRN  oxycodone    5 mG/acetaminophen 325 mG 2 Tablet(s) Oral every 6 hours PRN      Vital Signs Last 24 Hrs  T(C): 36.4 (15 Feb 2022 11:27), Max: 36.9 (14 Feb 2022 15:30)  T(F): 97.6 (15 Feb 2022 11:27), Max: 98.4 (14 Feb 2022 15:30)  HR: 85 (15 Feb 2022 11:27) (85 - 99)  BP: 156/67 (15 Feb 2022 11:27) (105/74 - 156/67)  BP(mean): --  RR: 18 (15 Feb 2022 11:27) (17 - 18)  SpO2: 95% (15 Feb 2022 11:27) (89% - 99%)    Physical Exam:  General: non toxic  Cardio: regular rate   Respiratory: nonlabored   abd: nondistended  Musculoskeletal: no focal joint swelling, no edema  vascular: no phlebitis   Skin: no rash  Neurologic: no focal deficit  psych: normal affect                          10.5   12.67 )-----------( 494      ( 15 Feb 2022 05:57 )             34.1       02-15    131<L>  |  99  |  14  ----------------------------<  157<H>  3.9   |  22  |  0.46<L>    Ca    7.5<L>      15 Feb 2022 05:57            MICROBIOLOGY:  v          RADIOLOGY:  Images below reviewed personally   Follow Up: Infected pleural effusion     Interval History/ROS: Had some increased pain overnight. Feels better now. Afebrile. Breathing comfortably. No diarrhea. Possible chest tube removal today.     Allergies  No Known Allergies        ANTIMICROBIALS:      OTHER MEDS:  acetaminophen     Tablet .. 650 milliGRAM(s) Oral every 6 hours PRN  amphetamine/dextroamphetamine XR 20 milliGRAM(s) Oral with breakfast  buPROPion XL (24-Hour) . 300 milliGRAM(s) Oral daily  influenza   Vaccine 0.5 milliLiter(s) IntraMuscular once  ondansetron Injectable 4 milliGRAM(s) IV Push every 8 hours PRN  oxycodone    5 mG/acetaminophen 325 mG 1 Tablet(s) Oral every 6 hours PRN  oxycodone    5 mG/acetaminophen 325 mG 2 Tablet(s) Oral every 6 hours PRN      Vital Signs Last 24 Hrs  T(C): 36.4 (15 Feb 2022 11:27), Max: 36.9 (14 Feb 2022 15:30)  T(F): 97.6 (15 Feb 2022 11:27), Max: 98.4 (14 Feb 2022 15:30)  HR: 85 (15 Feb 2022 11:27) (85 - 99)  BP: 156/67 (15 Feb 2022 11:27) (105/74 - 156/67)  BP(mean): --  RR: 18 (15 Feb 2022 11:27) (17 - 18)  SpO2: 95% (15 Feb 2022 11:27) (89% - 99%)    Physical Exam:  General: non toxic, obese  Cardio: regular rate   Respiratory: nonlabored on room air. left chest tube in place   abd: nondistended  psych: normal affect                          10.5   12.67 )-----------( 494      ( 15 Feb 2022 05:57 )             34.1       02-15    131<L>  |  99  |  14  ----------------------------<  157<H>  3.9   |  22  |  0.46<L>    Ca    7.5<L>      15 Feb 2022 05:57            MICROBIOLOGY:  Culture - Fungal, Body Fluid (collected 02-11-22 @ 17:26)  Source: .Body Fluid #2 pleural effusion  Preliminary Report (02-14-22 @ 09:29):    Testing in progress    Culture - Body Fluid with Gram Stain (collected 02-11-22 @ 17:26)  Source: .Body Fluid Pleural Fluid  Gram Stain (02-11-22 @ 22:05):    Numerous polymorphonuclear leukocytes per low power field    No organisms seen  Preliminary Report (02-12-22 @ 11:08):    No growth    Culture - Acid Fast - Body Fluid w/Smear (collected 02-11-22 @ 17:26)  Source: .Body Fluid Pleural Fluid    Culture - Acid Fast - Tissue w/Smear (collected 02-11-22 @ 17:26)  Source: .Tissue Other    Culture - Fungal, Tissue (collected 02-11-22 @ 17:26)  Source: .Tissue #1 upper lobe pleural peel  Preliminary Report (02-14-22 @ 09:27):    Testing in progress    Culture - Tissue with Gram Stain (collected 02-11-22 @ 17:26)  Source: .Tissue Other  Gram Stain (02-11-22 @ 22:05):    Few polymorphonuclear leukocytes per low power field    No organisms seen  Preliminary Report (02-12-22 @ 12:21):    No growth    Culture - Tissue with Gram Stain (collected 02-11-22 @ 17:24)  Source: .Tissue Other  Gram Stain (02-11-22 @ 22:41):    No polymorphonuclear leukocytes    No organisms seen per oil power field  Preliminary Report (02-12-22 @ 12:37):    No growth    Culture - Fungal, Tissue (collected 02-11-22 @ 17:24)  Source: .Tissue #3 upper lobe pleural peel  Preliminary Report (02-14-22 @ 09:28):    Testing in progress    Culture - Acid Fast - Tissue w/Smear (collected 02-11-22 @ 17:24)  Source: .Tissue Other        RADIOLOGY:  Images below reviewed personally  CXR 2/15: effusion looks smaller to me     Xray Chest 1 View- PORTABLE-Urgent (Xray Chest 1 View- PORTABLE-Urgent .) (02.14.22 @ 10:06)   No pneumothorax.

## 2022-02-15 NOTE — PROGRESS NOTE ADULT - ASSESSMENT
51F here 2/7 with infected left pleural effusion.   s/p VATS decortication 2/11 with Strep intermedius growth.   Maybe micro aspiration led to this? Possible risk with obesity and lap band. Had a cold around [place] but seems too remote.   Doing well. One tube removed 2/14, second one planned for today.     Suggest  -Unasyn 3GM IV q6h   -chest tube management per CT surgery  -at time of discharge, change to Augmentin 875mg BID to finish 3/11 for a four week course     Discussed with CT surgery     Steve Tracey MD   Infectious Disease   Available on TEAMS. After 5PM and on weekends please page fellow on call or call [phone]

## 2022-02-15 NOTE — PROGRESS NOTE ADULT - SUBJECTIVE AND OBJECTIVE BOX
Day 4 of Anesthesia Pain Management Service    SUBJECTIVE: I'm doing ok    Pain Scale Score:	[X] Refer to charted pain scores    THERAPY:    [ ] IV PCA Morphine		[ ] 5 mg/mL	[ ] 1 mg/mL  [X] IV PCA Hydromorphone	[ ] 5 mg/mL	[X] 1 mg/mL  [ ] IV PCA Fentanyl		[ ] 50 micrograms/mL    Demand dose: 0.2 mg     Lockout: 6 minutes   Continuous Rate: 0 mg/hr  4 Hour Limit: 4 mg    MEDICATIONS  (STANDING):  amphetamine/dextroamphetamine XR 20 milliGRAM(s) Oral with breakfast  ampicillin/sulbactam  IVPB      ampicillin/sulbactam  IVPB 3 Gram(s) IV Intermittent every 6 hours  buPROPion XL (24-Hour) . 300 milliGRAM(s) Oral daily  HYDROmorphone PCA (1 mG/mL) 30 milliLiter(s) PCA Continuous PCA Continuous  influenza   Vaccine 0.5 milliLiter(s) IntraMuscular once  sodium chloride 0.9%. 1000 milliLiter(s) (30 mL/Hr) IV Continuous <Continuous>    MEDICATIONS  (PRN):  acetaminophen     Tablet .. 650 milliGRAM(s) Oral every 6 hours PRN Temp greater or equal to 38C (100.4F), Mild Pain (1 - 3)  ondansetron Injectable 4 milliGRAM(s) IV Push every 8 hours PRN Nausea and/or Vomiting      OBJECTIVE:    Sedation Score:	[ X] Alert 	[ ] Drowsy 	[ ] Arousable	[ ] Asleep	[ ] Unresponsive    Side Effects:	[ ] None	[X ] Nausea: zofran prn	[ ] Vomiting	[ ] Pruritus  		[ ] Other:    Vital Signs Last 24 Hrs  T(C): 36.3 (15 Feb 2022 07:30), Max: 36.9 (14 Feb 2022 15:30)  T(F): 97.3 (15 Feb 2022 07:30), Max: 98.4 (14 Feb 2022 15:30)  HR: 91 (15 Feb 2022 07:30) (91 - 99)  BP: 136/61 (15 Feb 2022 07:30) (105/74 - 136/61)  BP(mean): --  RR: 18 (15 Feb 2022 07:30) (17 - 18)  SpO2: 95% (15 Feb 2022 07:30) (89% - 99%)    ASSESSMENT/ PLAN    Therapy to  be:               [X] Continued   [ ] Discontinued   [ ] Changed to PRN Analgesics    Documentation and Verification of current medications:   [X] Done	[ ] Not done, not eligible    Comments: OOB in chair. Endorsing nausea with PCA use. Minimal use. Dose adjusted to 0.15mg, antiemetics prn. Consider transition to po analgesics

## 2022-02-15 NOTE — PROGRESS NOTE ADULT - PROBLEM SELECTOR PLAN 1
sp   lt vats decort  last chest tube removed today   tolerated well,    pt can shower.  Thoracic surgery to follow up with Dr Willett  CTS   as an outpt in 10 days   for stitich removal,  pt to call    [phone]   for appt        Patient encouraged to eat meals OOB to chair   and  ambulate   will need pain meds at discharge.  Continue care as per primary team

## 2022-02-15 NOTE — PROGRESS NOTE ADULT - SUBJECTIVE AND OBJECTIVE BOX
Pain Management Attending Addendum    SUBJECTIVE: Patient doing well with IV PCA    Therapy:    [X] IV PCA         [ ] PRN Analgesics    OBJECTIVE:   [X] Pain appropriately controlled    [ ] Other:    Side Effects:  [X] None	             [ ] Nausea              [ ] Pruritis                	[ ] Other:    ASSESSMENT/PLAN: Continue current therapy    Comments: Decreased dose due to nausea. Encouraged Tylenol use as well.

## 2022-02-15 NOTE — PROGRESS NOTE ADULT - ASSESSMENT
51F w/ PMHx of depression, obesity s/p lap band by Dr. Acevedo in 2010 who presents to the ED with a 4 day history of vague epigastric/LUQ pain that radiated to the left chest and scapula. Patient refers having poor PO intake, some nausea but no vomiting, passing gas and having BMs.     In the ED, patient was afebrile, tachycardic to 110s, normotensive, labs remarkable for leukocytosis of 19k, otherwise WNL. CTAP revealed oral contrast revealed adequate lap band placement, contrast in stomach, small bowel, and esophagus. Also, large left pleural effusion is partially appreciated. Thoracic surgery consulted for evaluation of pleural effusion.    2/8 VSS; patient is comfortable on 2L NC. L PTC - LWS in place   2/9 VSS, plan for intrapleural TPA via left pigtail today, hold anticoagulation.  2/10 VSS, continue intrapleural TPA, keep NPO p MN for possible VATS decort Friday.  2/11 NPO Left VATs decortication today.   2/12 maintain chest tubes to LWS, PCA pump, CXR negative for ptx. OR cultures pending   2/13  Will maintain chest tubes to LWS.  Pain well controlled on PCA pump.  Encouraged to eat meals OOB to recliner.    2/14   lt post tube pulled   tolerated well    vvv   PT seeing pt   OOB to chair  tube to water seal  2/15    dc last chest tube,  pt tolerated well

## 2022-02-15 NOTE — PROGRESS NOTE ADULT - ASSESSMENT
51F w/ class III obesity s/p miguel (2010) p/w sob, abdominal pain and chest pain found to have left-sided loculated pleural effusion with sepsis    Loculated pleural effusion:    S/p removal of the last Chest tube  S/p VATs   Thoracic Sx/ID f/up appreciated  IV Zosyn  Dc planning on PO Augmentin till 3/11

## 2022-02-16 LAB
ANION GAP SERPL CALC-SCNC: 17 MMOL/L — SIGNIFICANT CHANGE UP (ref 5–17)
BUN SERPL-MCNC: 13 MG/DL — SIGNIFICANT CHANGE UP (ref 7–23)
CALCIUM SERPL-MCNC: 8.7 MG/DL — SIGNIFICANT CHANGE UP (ref 8.4–10.5)
CHLORIDE SERPL-SCNC: 93 MMOL/L — LOW (ref 96–108)
CO2 SERPL-SCNC: 18 MMOL/L — LOW (ref 22–31)
CREAT SERPL-MCNC: 0.61 MG/DL — SIGNIFICANT CHANGE UP (ref 0.5–1.3)
CULTURE RESULTS: SIGNIFICANT CHANGE UP
CULTURE RESULTS: SIGNIFICANT CHANGE UP
GLUCOSE SERPL-MCNC: 158 MG/DL — HIGH (ref 70–99)
HCT VFR BLD CALC: 37.7 % — SIGNIFICANT CHANGE UP (ref 34.5–45)
HGB BLD-MCNC: 11.7 G/DL — SIGNIFICANT CHANGE UP (ref 11.5–15.5)
MCHC RBC-ENTMCNC: 26.7 PG — LOW (ref 27–34)
MCHC RBC-ENTMCNC: 31 GM/DL — LOW (ref 32–36)
MCV RBC AUTO: 86.1 FL — SIGNIFICANT CHANGE UP (ref 80–100)
NRBC # BLD: 0 /100 WBCS — SIGNIFICANT CHANGE UP (ref 0–0)
PLATELET # BLD AUTO: 469 K/UL — HIGH (ref 150–400)
POTASSIUM SERPL-MCNC: 4.3 MMOL/L — SIGNIFICANT CHANGE UP (ref 3.5–5.3)
POTASSIUM SERPL-MCNC: 5.6 MMOL/L — HIGH (ref 3.5–5.3)
POTASSIUM SERPL-SCNC: 4.3 MMOL/L — SIGNIFICANT CHANGE UP (ref 3.5–5.3)
POTASSIUM SERPL-SCNC: 5.6 MMOL/L — HIGH (ref 3.5–5.3)
RBC # BLD: 4.38 M/UL — SIGNIFICANT CHANGE UP (ref 3.8–5.2)
RBC # FLD: 13.4 % — SIGNIFICANT CHANGE UP (ref 10.3–14.5)
SODIUM SERPL-SCNC: 128 MMOL/L — LOW (ref 135–145)
SPECIMEN SOURCE: SIGNIFICANT CHANGE UP
SPECIMEN SOURCE: SIGNIFICANT CHANGE UP
WBC # BLD: 11.05 K/UL — HIGH (ref 3.8–10.5)
WBC # FLD AUTO: 11.05 K/UL — HIGH (ref 3.8–10.5)

## 2022-02-16 PROCEDURE — 71045 X-RAY EXAM CHEST 1 VIEW: CPT | Mod: 26

## 2022-02-16 PROCEDURE — 99231 SBSQ HOSP IP/OBS SF/LOW 25: CPT

## 2022-02-16 RX ORDER — SODIUM ZIRCONIUM CYCLOSILICATE 10 G/10G
10 POWDER, FOR SUSPENSION ORAL ONCE
Refills: 0 | Status: COMPLETED | OUTPATIENT
Start: 2022-02-16 | End: 2022-02-16

## 2022-02-16 RX ADMIN — OXYCODONE AND ACETAMINOPHEN 1 TABLET(S): 5; 325 TABLET ORAL at 03:04

## 2022-02-16 RX ADMIN — SODIUM ZIRCONIUM CYCLOSILICATE 10 GRAM(S): 10 POWDER, FOR SUSPENSION ORAL at 13:34

## 2022-02-16 RX ADMIN — OXYCODONE AND ACETAMINOPHEN 1 TABLET(S): 5; 325 TABLET ORAL at 14:27

## 2022-02-16 RX ADMIN — AMPICILLIN SODIUM AND SULBACTAM SODIUM 200 GRAM(S): 250; 125 INJECTION, POWDER, FOR SUSPENSION INTRAMUSCULAR; INTRAVENOUS at 03:15

## 2022-02-16 RX ADMIN — OXYCODONE AND ACETAMINOPHEN 1 TABLET(S): 5; 325 TABLET ORAL at 08:55

## 2022-02-16 RX ADMIN — OXYCODONE AND ACETAMINOPHEN 1 TABLET(S): 5; 325 TABLET ORAL at 10:44

## 2022-02-16 RX ADMIN — OXYCODONE AND ACETAMINOPHEN 1 TABLET(S): 5; 325 TABLET ORAL at 02:08

## 2022-02-16 RX ADMIN — AMPICILLIN SODIUM AND SULBACTAM SODIUM 200 GRAM(S): 250; 125 INJECTION, POWDER, FOR SUSPENSION INTRAMUSCULAR; INTRAVENOUS at 09:46

## 2022-02-16 RX ADMIN — OXYCODONE AND ACETAMINOPHEN 2 TABLET(S): 5; 325 TABLET ORAL at 18:23

## 2022-02-16 RX ADMIN — OXYCODONE AND ACETAMINOPHEN 1 TABLET(S): 5; 325 TABLET ORAL at 18:17

## 2022-02-16 RX ADMIN — BUPROPION HYDROCHLORIDE 300 MILLIGRAM(S): 150 TABLET, EXTENDED RELEASE ORAL at 11:17

## 2022-02-16 RX ADMIN — AMPICILLIN SODIUM AND SULBACTAM SODIUM 200 GRAM(S): 250; 125 INJECTION, POWDER, FOR SUSPENSION INTRAMUSCULAR; INTRAVENOUS at 14:44

## 2022-02-16 RX ADMIN — AMPICILLIN SODIUM AND SULBACTAM SODIUM 200 GRAM(S): 250; 125 INJECTION, POWDER, FOR SUSPENSION INTRAMUSCULAR; INTRAVENOUS at 22:08

## 2022-02-16 NOTE — PROGRESS NOTE ADULT - SUBJECTIVE AND OBJECTIVE BOX
Patient is a 51y old  Female who presents with a chief complaint of 51F p/w sob, abdominal pain and chest pain (16 Feb 2022 20:44)      SUBJECTIVE / OVERNIGHT EVENTS:    Events noted.  CONSTITUTIONAL: No fever,  or fatigue  RESPIRATORY: No cough, wheezing,  No shortness of breath  CARDIOVASCULAR: No chest pain, palpitations, dizziness, or leg swelling  GASTROINTESTINAL: No abdominal or epigastric pain. No nausea, vomiting.      MEDICATIONS  (STANDING):  amphetamine/dextroamphetamine XR 20 milliGRAM(s) Oral with breakfast  ampicillin/sulbactam  IVPB 3 Gram(s) IV Intermittent every 6 hours  buPROPion XL (24-Hour) . 300 milliGRAM(s) Oral daily  influenza   Vaccine 0.5 milliLiter(s) IntraMuscular once    MEDICATIONS  (PRN):  acetaminophen     Tablet .. 650 milliGRAM(s) Oral every 6 hours PRN Temp greater or equal to 38C (100.4F), Mild Pain (1 - 3)  ondansetron Injectable 4 milliGRAM(s) IV Push every 8 hours PRN Nausea and/or Vomiting  oxycodone    5 mG/acetaminophen 325 mG 1 Tablet(s) Oral every 6 hours PRN Moderate Pain (4 - 6)  oxycodone    5 mG/acetaminophen 325 mG 2 Tablet(s) Oral every 6 hours PRN Severe Pain (7 - 10)        CAPILLARY BLOOD GLUCOSE        I&O's Summary    15 Feb 2022 07:01  -  16 Feb 2022 07:00  --------------------------------------------------------  IN: 0 mL / OUT: 2650 mL / NET: -2650 mL    16 Feb 2022 07:01  -  16 Feb 2022 22:56  --------------------------------------------------------  IN: 745 mL / OUT: 1250 mL / NET: -505 mL        T(C): 36.4 (02-16-22 @ 21:14), Max: 36.7 (02-16-22 @ 04:55)  HR: 90 (02-16-22 @ 21:14) (80 - 94)  BP: 116/77 (02-16-22 @ 21:14) (112/75 - 133/83)  RR: 18 (02-16-22 @ 21:14) (17 - 18)  SpO2: 95% (02-16-22 @ 21:14) (94% - 96%)    PHYSICAL EXAM:  GENERAL: NAD  NECK: Supple, No JVD  CHEST/LUNG: Clear to auscultation bilaterally; No wheezing.  HEART: Regular rate and rhythm; No murmurs, rubs, or gallops  ABDOMEN: Soft, Nontender, Nondistended; Bowel sounds present  EXTREMITIES:   No edema  NEUROLOGY: AAO X 3      LABS:                        11.7   11.05 )-----------( 469      ( 16 Feb 2022 05:52 )             37.7     02-16    x   |  x   |  x   ----------------------------<  x   4.3   |  x   |  x     Ca    8.7      16 Feb 2022 05:52              CAPILLARY BLOOD GLUCOSE            RADIOLOGY & ADDITIONAL TESTS:    Imaging Personally Reviewed:    Consultant(s) Notes Reviewed:      Care Discussed with Consultants/Other Providers:    Ronak Mcdonnell MD, CMD, FACP    [address] [place] [address]  Office Tel: [phone]  Cell: [phone]

## 2022-02-16 NOTE — PROGRESS NOTE ADULT - PROBLEM SELECTOR PROBLEM 3
ADHD (attention deficit hyperactivity disorder)
Acute hyperglycemia
ADHD (attention deficit hyperactivity disorder)
Acute hyperglycemia
ADHD (attention deficit hyperactivity disorder)
Acute hyperglycemia

## 2022-02-16 NOTE — PROGRESS NOTE ADULT - PROBLEM SELECTOR PLAN 3
c/w home dosing of adderall
HbA1c in AM  consistent carb diet
monitor glucose- if persistently elevated then will need to start insulin sliding scale  consistent carb diet
c/w home dosing of adderall
monitor glucose- if persistently elevated then will need to start insulin sliding scale  consistent carb diet
c/w home dosing of adderall

## 2022-02-16 NOTE — PROGRESS NOTE ADULT - PROBLEM SELECTOR PROBLEM 1
Loculated pleural effusion

## 2022-02-16 NOTE — PROGRESS NOTE ADULT - PROBLEM SELECTOR PROBLEM 4
Anxiety and depression
Class 3 severe obesity in adult
Anxiety and depression
Anxiety and depression
2

## 2022-02-16 NOTE — PROGRESS NOTE ADULT - PROBLEM SELECTOR PLAN 2
c/w home dosing of buproprion
leukocytosis, tachycardia, loculated pleural effusion  - IV Abx

## 2022-02-16 NOTE — PROGRESS NOTE ADULT - SUBJECTIVE AND OBJECTIVE BOX
Follow Up: pleural infection    Interval History/ROS: Afebrile. Last chest tube was removed yesterday. No diarrhea, had a bowel movement and it was normal. Breathing comfortably. Asked about risk of recurrent infection like this. Denies any clear aspiration episodes but goes have reflux.     Allergies  No Known Allergies        ANTIMICROBIALS:  ampicillin/sulbactam  IVPB 3 every 6 hours      OTHER MEDS:  acetaminophen     Tablet .. 650 milliGRAM(s) Oral every 6 hours PRN  amphetamine/dextroamphetamine XR 20 milliGRAM(s) Oral with breakfast  buPROPion XL (24-Hour) . 300 milliGRAM(s) Oral daily  influenza   Vaccine 0.5 milliLiter(s) IntraMuscular once  ondansetron Injectable 4 milliGRAM(s) IV Push every 8 hours PRN  oxycodone    5 mG/acetaminophen 325 mG 1 Tablet(s) Oral every 6 hours PRN  oxycodone    5 mG/acetaminophen 325 mG 2 Tablet(s) Oral every 6 hours PRN      Vital Signs Last 24 Hrs  T(C): 36.6 (16 Feb 2022 13:03), Max: 36.7 (16 Feb 2022 04:55)  T(F): 97.9 (16 Feb 2022 13:03), Max: 98.1 (16 Feb 2022 04:55)  HR: 94 (16 Feb 2022 18:16) (80 - 94)  BP: 133/83 (16 Feb 2022 18:16) (112/75 - 133/83)  BP(mean): --  RR: 18 (16 Feb 2022 13:03) (17 - 18)  SpO2: 95% (16 Feb 2022 13:03) (94% - 96%)    Physical Exam:  General: non toxic, obese  Cardio: regular rate   Respiratory: nonlabored on room air   abd: nondistended  Neurologic: no focal deficit  psych: normal affect                          11.7   11.05 )-----------( 469      ( 16 Feb 2022 05:52 )             37.7       02-16    x   |  x   |  x   ----------------------------<  x   4.3   |  x   |  x     Ca    8.7      16 Feb 2022 05:52            MICROBIOLOGY:  Culture - Fungal, Body Fluid (collected 02-11-22 @ 17:26)  Source: .Body Fluid #2 pleural effusion  Preliminary Report (02-14-22 @ 09:29):    Testing in progress    Culture - Body Fluid with Gram Stain (collected 02-11-22 @ 17:26)  Source: .Body Fluid Pleural Fluid  Gram Stain (02-11-22 @ 22:05):    Numerous polymorphonuclear leukocytes per low power field    No organisms seen  Final Report (02-16-22 @ 09:36):    No growth at 5 days    Culture - Acid Fast - Body Fluid w/Smear (collected 02-11-22 @ 17:26)  Source: .Body Fluid Pleural Fluid  Preliminary Report (02-16-22 @ 15:03):    Culture is being performed.    Culture - Acid Fast - Tissue w/Smear (collected 02-11-22 @ 17:26)  Source: .Tissue Other  Preliminary Report (02-16-22 @ 15:03):    Culture is being performed.    Culture - Fungal, Tissue (collected 02-11-22 @ 17:26)  Source: .Tissue #1 upper lobe pleural peel  Preliminary Report (02-14-22 @ 09:27):    Testing in progress    Culture - Tissue with Gram Stain (collected 02-11-22 @ 17:26)  Source: .Tissue Other  Gram Stain (02-11-22 @ 22:05):    Few polymorphonuclear leukocytes per low power field    No organisms seen  Final Report (02-16-22 @ 09:32):    No growth at 5 days    Culture - Tissue with Gram Stain (collected 02-11-22 @ 17:24)  Source: .Tissue Other  Gram Stain (02-11-22 @ 22:41):    No polymorphonuclear leukocytes    No organisms seen per oil power field  Preliminary Report (02-12-22 @ 12:37):    No growth    Culture - Fungal, Tissue (collected 02-11-22 @ 17:24)  Source: .Tissue #3 upper lobe pleural peel  Preliminary Report (02-14-22 @ 09:28):    Testing in progress    Culture - Acid Fast - Tissue w/Smear (collected 02-11-22 @ 17:24)  Source: .Tissue Other  Preliminary Report (02-16-22 @ 15:03):    Culture is being performed.          RADIOLOGY:  Images below reviewed personally  Xray Chest 1 View- PORTABLE-Routine (Xray Chest 1 View- PORTABLE-Routine in AM.) (02.16.22 @ 06:13)   Similar left base density. No pneumothorax.

## 2022-02-16 NOTE — PROGRESS NOTE ADULT - PROBLEM SELECTOR PROBLEM 2
Sepsis
Anxiety and depression
Sepsis
Sepsis

## 2022-02-16 NOTE — PROGRESS NOTE ADULT - ASSESSMENT
51F w/ class III obesity s/p miguel (2010) p/w sob, abdominal pain and chest pain found to have left-sided loculated pleural effusion with sepsis    Loculated pleural effusion:    S/p removal of the last Chest tube  S/p VATs   Thoracic Sx/ID f/up appreciated  IV Zosyn      Hyperkalemia:    Lokelma  BMP

## 2022-02-16 NOTE — PROGRESS NOTE ADULT - PROBLEM SELECTOR PLAN 4
surgery f/up appreciated  S/p Lap band deflation
c/w home dosing of buproprion
c/w home dosing of buproprion
surgery f/up appreciated  S/p Lap band deflation
surgery f/up appreciated  S/p Lap band deflation
c/w home dosing of buproprion

## 2022-02-16 NOTE — PROGRESS NOTE ADULT - ASSESSMENT
51F here 2/7 with infected left pleural effusion.   s/p VATS decortication 2/11 with Strep intermedius growth.   Maybe micro aspiration led to this? Possible risk with obesity and lap band. Had a cold around [place] but seems too remote.   Doing well. Chest tubes all out and on room air.     Suggest  -Unasyn 3GM IV q6h   -at time of discharge, change to Augmentin 875mg BID to finish 3/11 for a four week course   -follow up outpatient with me     Steve Tracey MD   Infectious Disease   Available on TEAMS. After 5PM and on weekends please page fellow on call or call [phone]

## 2022-02-17 ENCOUNTER — TRANSCRIPTION ENCOUNTER (OUTPATIENT)
Age: 52
End: 2022-02-17

## 2022-02-17 VITALS
RESPIRATION RATE: 18 BRPM | OXYGEN SATURATION: 94 % | TEMPERATURE: 98 F | HEART RATE: 93 BPM | DIASTOLIC BLOOD PRESSURE: 72 MMHG | SYSTOLIC BLOOD PRESSURE: 112 MMHG

## 2022-02-17 DIAGNOSIS — R09.02 HYPOXEMIA: ICD-10-CM

## 2022-02-17 DIAGNOSIS — R10.13 EPIGASTRIC PAIN: ICD-10-CM

## 2022-02-17 DIAGNOSIS — R11.2 NAUSEA WITH VOMITING, UNSPECIFIED: ICD-10-CM

## 2022-02-17 DIAGNOSIS — E66.01 MORBID (SEVERE) OBESITY DUE TO EXCESS CALORIES: ICD-10-CM

## 2022-02-17 LAB
ANION GAP SERPL CALC-SCNC: 9 MMOL/L — SIGNIFICANT CHANGE UP (ref 5–17)
BUN SERPL-MCNC: 12 MG/DL — SIGNIFICANT CHANGE UP (ref 7–23)
CALCIUM SERPL-MCNC: 8.5 MG/DL — SIGNIFICANT CHANGE UP (ref 8.4–10.5)
CHLORIDE SERPL-SCNC: 97 MMOL/L — SIGNIFICANT CHANGE UP (ref 96–108)
CO2 SERPL-SCNC: 27 MMOL/L — SIGNIFICANT CHANGE UP (ref 22–31)
CREAT SERPL-MCNC: 0.55 MG/DL — SIGNIFICANT CHANGE UP (ref 0.5–1.3)
GLUCOSE SERPL-MCNC: 145 MG/DL — HIGH (ref 70–99)
HCT VFR BLD CALC: 31.7 % — LOW (ref 34.5–45)
HGB BLD-MCNC: 10.1 G/DL — LOW (ref 11.5–15.5)
MAGNESIUM SERPL-MCNC: 2 MG/DL — SIGNIFICANT CHANGE UP (ref 1.6–2.6)
MCHC RBC-ENTMCNC: 27.1 PG — SIGNIFICANT CHANGE UP (ref 27–34)
MCHC RBC-ENTMCNC: 31.9 GM/DL — LOW (ref 32–36)
MCV RBC AUTO: 85 FL — SIGNIFICANT CHANGE UP (ref 80–100)
NON-GYNECOLOGICAL CYTOLOGY STUDY: SIGNIFICANT CHANGE UP
NRBC # BLD: 0 /100 WBCS — SIGNIFICANT CHANGE UP (ref 0–0)
PHOSPHATE SERPL-MCNC: 2.9 MG/DL — SIGNIFICANT CHANGE UP (ref 2.5–4.5)
PLATELET # BLD AUTO: 489 K/UL — HIGH (ref 150–400)
POTASSIUM SERPL-MCNC: 3.9 MMOL/L — SIGNIFICANT CHANGE UP (ref 3.5–5.3)
POTASSIUM SERPL-SCNC: 3.9 MMOL/L — SIGNIFICANT CHANGE UP (ref 3.5–5.3)
RBC # BLD: 3.73 M/UL — LOW (ref 3.8–5.2)
RBC # FLD: 13.5 % — SIGNIFICANT CHANGE UP (ref 10.3–14.5)
SODIUM SERPL-SCNC: 133 MMOL/L — LOW (ref 135–145)
WBC # BLD: 8.71 K/UL — SIGNIFICANT CHANGE UP (ref 3.8–10.5)
WBC # FLD AUTO: 8.71 K/UL — SIGNIFICANT CHANGE UP (ref 3.8–10.5)

## 2022-02-17 PROCEDURE — 99231 SBSQ HOSP IP/OBS SF/LOW 25: CPT

## 2022-02-17 RX ADMIN — OXYCODONE AND ACETAMINOPHEN 1 TABLET(S): 5; 325 TABLET ORAL at 01:34

## 2022-02-17 RX ADMIN — BUPROPION HYDROCHLORIDE 300 MILLIGRAM(S): 150 TABLET, EXTENDED RELEASE ORAL at 11:51

## 2022-02-17 RX ADMIN — OXYCODONE AND ACETAMINOPHEN 1 TABLET(S): 5; 325 TABLET ORAL at 07:28

## 2022-02-17 RX ADMIN — OXYCODONE AND ACETAMINOPHEN 1 TABLET(S): 5; 325 TABLET ORAL at 08:28

## 2022-02-17 RX ADMIN — AMPICILLIN SODIUM AND SULBACTAM SODIUM 200 GRAM(S): 250; 125 INJECTION, POWDER, FOR SUSPENSION INTRAMUSCULAR; INTRAVENOUS at 09:59

## 2022-02-17 RX ADMIN — AMPICILLIN SODIUM AND SULBACTAM SODIUM 200 GRAM(S): 250; 125 INJECTION, POWDER, FOR SUSPENSION INTRAMUSCULAR; INTRAVENOUS at 02:41

## 2022-02-17 RX ADMIN — OXYCODONE AND ACETAMINOPHEN 1 TABLET(S): 5; 325 TABLET ORAL at 07:57

## 2022-02-17 RX ADMIN — OXYCODONE AND ACETAMINOPHEN 1 TABLET(S): 5; 325 TABLET ORAL at 13:19

## 2022-02-17 RX ADMIN — OXYCODONE AND ACETAMINOPHEN 1 TABLET(S): 5; 325 TABLET ORAL at 16:59

## 2022-02-17 RX ADMIN — OXYCODONE AND ACETAMINOPHEN 2 TABLET(S): 5; 325 TABLET ORAL at 07:28

## 2022-02-17 RX ADMIN — OXYCODONE AND ACETAMINOPHEN 1 TABLET(S): 5; 325 TABLET ORAL at 11:52

## 2022-02-17 NOTE — DISCHARGE NOTE PROVIDER - HOSPITAL COURSE
51F w/ class III obesity s/p miguel (2010) p/w sob, abdominal pain and chest pain found to have left-sided loculated pleural effusion 51F w/ class III obesity s/p miguel (2010) p/w sob, abdominal pain and chest pain found to have left-sided loculated pleural effusion. pt is s/p VATS decortication and chest tube. Completed a course of ceftriaxone and azithromycin, on a course of antibiotics with Unasyn IVPB with transition to PO upon d/c. Pt is tolerating room air and has little to no pain. Will followup with Dr Steve Tracey of ID outpatient within a week. 51F w/ class III obesity s/p miguel (2010) p/w sob, abdominal pain and chest pain found to have left-sided loculated pleural effusion. pt is s/p VATS decortication and chest tube. Completed a course of ceftriaxone and azithromycin, on a course of antibiotics with Unasyn IVPB with transition to PO upon d/c. Pt is tolerating room air and has little to no pain. Will followup with Dr Steve Tracey of ID outpatient within a week and Dr Gonsalez on 2/22 1015.

## 2022-02-17 NOTE — PROGRESS NOTE ADULT - ASSESSMENT
51F here 2/7 with infected left pleural effusion.   s/p VATS decortication 2/11 with Strep intermedius growth.   Maybe micro aspiration led to this? Possible risk with obesity and lap band. Had a cold around [place] but seems too remote.   Doing well. Chest tubes all out and on room air.     Suggest  -Unasyn 3GM IV q6h   -at time of discharge, change to Augmentin 875mg BID to finish 3/11 for a four week course   -follow up outpatient with me     Steve Tracey MD   Infectious Disease   Available on TEAMS. After 5PM and on weekends please page fellow on call or call [phone]

## 2022-02-17 NOTE — PROGRESS NOTE ADULT - SUBJECTIVE AND OBJECTIVE BOX
Follow Up: infected pleural    Interval History/ROS: Afebrile. No pain. No diarrhea.     Allergies  No Known Allergies        ANTIMICROBIALS:  ampicillin/sulbactam  IVPB 3 every 6 hours      OTHER MEDS:  acetaminophen     Tablet .. 650 milliGRAM(s) Oral every 6 hours PRN  amphetamine/dextroamphetamine XR 20 milliGRAM(s) Oral with breakfast  buPROPion XL (24-Hour) . 300 milliGRAM(s) Oral daily  influenza   Vaccine 0.5 milliLiter(s) IntraMuscular once  ondansetron Injectable 4 milliGRAM(s) IV Push every 8 hours PRN  oxycodone    5 mG/acetaminophen 325 mG 1 Tablet(s) Oral every 6 hours PRN  oxycodone    5 mG/acetaminophen 325 mG 2 Tablet(s) Oral every 6 hours PRN      Vital Signs Last 24 Hrs  T(C): 36.7 (17 Feb 2022 12:46), Max: 36.7 (17 Feb 2022 12:46)  T(F): 98 (17 Feb 2022 12:46), Max: 98 (17 Feb 2022 12:46)  HR: 93 (17 Feb 2022 12:46) (86 - 94)  BP: 112/72 (17 Feb 2022 12:46) (112/72 - 133/83)  BP(mean): --  RR: 18 (17 Feb 2022 12:46) (18 - 18)  SpO2: 94% (17 Feb 2022 12:46) (94% - 95%)    Physical Exam:  General: non toxic, obese   Cardio: regular rate   Respiratory: nonlabored on room air   Neurologic: no focal deficit  psych: normal affect                          10.1   8.71  )-----------( 489      ( 17 Feb 2022 06:17 )             31.7       02-17    133<L>  |  97  |  12  ----------------------------<  145<H>  3.9   |  27  |  0.55    Ca    8.5      17 Feb 2022 06:17  Phos  2.9     02-17  Mg     2.0     02-17            MICROBIOLOGY:  Culture - Fungal, Body Fluid (collected 02-11-22 @ 17:26)  Source: .Body Fluid #2 pleural effusion  Preliminary Report (02-14-22 @ 09:29):    Testing in progress    Culture - Body Fluid with Gram Stain (collected 02-11-22 @ 17:26)  Source: .Body Fluid Pleural Fluid  Gram Stain (02-11-22 @ 22:05):    Numerous polymorphonuclear leukocytes per low power field    No organisms seen  Final Report (02-16-22 @ 09:36):    No growth at 5 days    Culture - Acid Fast - Body Fluid w/Smear (collected 02-11-22 @ 17:26)  Source: .Body Fluid Pleural Fluid  Preliminary Report (02-16-22 @ 15:03):    Culture is being performed.    Culture - Acid Fast - Tissue w/Smear (collected 02-11-22 @ 17:26)  Source: .Tissue Other  Preliminary Report (02-16-22 @ 15:03):    Culture is being performed.    Culture - Fungal, Tissue (collected 02-11-22 @ 17:26)  Source: .Tissue #1 upper lobe pleural peel  Preliminary Report (02-14-22 @ 09:27):    Testing in progress    Culture - Tissue with Gram Stain (collected 02-11-22 @ 17:26)  Source: .Tissue Other  Gram Stain (02-11-22 @ 22:05):    Few polymorphonuclear leukocytes per low power field    No organisms seen  Final Report (02-16-22 @ 09:32):    No growth at 5 days    Culture - Tissue with Gram Stain (collected 02-11-22 @ 17:24)  Source: .Tissue Other  Gram Stain (02-11-22 @ 22:41):    No polymorphonuclear leukocytes    No organisms seen per oil power field  Preliminary Report (02-12-22 @ 12:37):    No growth    Culture - Fungal, Tissue (collected 02-11-22 @ 17:24)  Source: .Tissue #3 upper lobe pleural peel  Preliminary Report (02-14-22 @ 09:28):    Testing in progress    Culture - Acid Fast - Tissue w/Smear (collected 02-11-22 @ 17:24)  Source: .Tissue Other  Preliminary Report (02-16-22 @ 15:03):    Culture is being performed.    RADIOLOGY:  Images below reviewed personally  Xray Chest 1 View- PORTABLE-Routine (Xray Chest 1 View- PORTABLE-Routine in AM.) (02.16.22 @ 06:13)   Similar left base density. No pneumothorax.

## 2022-02-17 NOTE — DISCHARGE NOTE PROVIDER - CARE PROVIDER_API CALL
Yulisa Morfin  [address] [facility]  2 floor [address]  [place] [address]  Phone: (   )    -  Fax: (   )    -  Follow Up Time:     Steve Tracey)  Internal Medicine  [address] [place] [id number]  Phone: [phone]  Fax: [phone]  Follow Up Time: 1 week    Caleb Acevedo)  Surgery  [address] [place] [id number]  Phone: [phone]  Fax: [phone]  Follow Up Time: 1 week

## 2022-02-17 NOTE — DISCHARGE NOTE PROVIDER - NSDCCPCAREPLAN_GEN_ALL_CORE_FT
PRINCIPAL DISCHARGE DIAGNOSIS  Diagnosis: Loculated pleural effusion  Assessment and Plan of Treatment: Transitioned to Augmentin and will complete a 4 week course on 3/11. S/p VATS and scheduled to follow up pathology results with Dr Willett on 2/22.      SECONDARY DISCHARGE DIAGNOSES  Diagnosis: ADHD (attention deficit hyperactivity disorder)  Assessment and Plan of Treatment: Continue with home dose of adderall    Diagnosis: Anxiety and depression  Assessment and Plan of Treatment: Continue with home dose of Wellbutrin    Diagnosis: Class 3 severe obesity in adult  Assessment and Plan of Treatment: S/p lap band deflation. Seen by surgery and will follow up routinely outpt with surgeon Curtis     PRINCIPAL DISCHARGE DIAGNOSIS  Diagnosis: Loculated pleural effusion  Assessment and Plan of Treatment: Transitioned to Augmentin and will complete a 4 week course on 3/11. S/p VATS and scheduled to follow up pathology results with Dr Willett on 2/22.      SECONDARY DISCHARGE DIAGNOSES  Diagnosis: ADHD (attention deficit hyperactivity disorder)  Assessment and Plan of Treatment: Continue with home dose of adderall    Diagnosis: Anxiety and depression  Assessment and Plan of Treatment: Continue with home dose of Wellbutrin    Diagnosis: Class 3 severe obesity in adult  Assessment and Plan of Treatment: S/p lap band deflation. Seen by surgery and will follow up routinely outpt with surgeon Acevedo    Diagnosis: Phlebitis  Assessment and Plan of Treatment: No s/s of swelling, pain or active infection. Pt advised to treat with warm compress. If increased pain, swelling or fevers, instructed to seek medical attention

## 2022-02-17 NOTE — DISCHARGE NOTE PROVIDER - CARE PROVIDERS DIRECT ADDRESSES
,DirectAddress_Unknown,DirectAddress_Unknown,[email or web address]

## 2022-02-17 NOTE — PROGRESS NOTE ADULT - REASON FOR ADMISSION
51F p/w sob, abdominal pain and chest pain
sp  lt vats deco
51F p/w sob, abdominal pain and chest pain
sp   rt vats  decort
51F p/w sob, abdominal pain and chest pain

## 2022-02-17 NOTE — DISCHARGE NOTE NURSING/CASE MANAGEMENT/SOCIAL WORK - NSDCPEFALRISK_GEN_ALL_CORE
For information on Fall & Injury Prevention, visit: [email or web address] OR  [email or web address] OR  https://www.cdc.gov/steadi/patient.html

## 2022-02-17 NOTE — PROGRESS NOTE ADULT - PROVIDER SPECIALTY LIST ADULT
Anesthesia
Surgery
Thoracic Surgery
Thoracic Surgery
Anesthesia
Anesthesia
Infectious Disease
Thoracic Surgery
Anesthesia
Infectious Disease
Internal Medicine
Infectious Disease
Surgery
Surgery
Thoracic Surgery
CT Surgery
Internal Medicine
Thoracic Surgery
Internal Medicine

## 2022-02-17 NOTE — DISCHARGE NOTE PROVIDER - NSDCMRMEDTOKEN_GEN_ALL_CORE_FT
buPROPion 300 mg/24 hours (XL) oral tablet, extended release: 1 tab(s) orally every 24 hours  dextroamphetamine-amphetamine 20 mg oral capsule, extended release: 1 cap(s) orally once a day (in the morning)   amoxicillin-clavulanate 875 mg-125 mg oral tablet: 875 milligram(s) orally 2 times a day   buPROPion 300 mg/24 hours (XL) oral tablet, extended release: 1 tab(s) orally every 24 hours  dextroamphetamine-amphetamine 20 mg oral capsule, extended release: 1 cap(s) orally once a day (in the morning)   amoxicillin-clavulanate 875 mg-125 mg oral tablet: 875 milligram(s) orally 2 times a day   buPROPion 300 mg/24 hours (XL) oral tablet, extended release: 1 tab(s) orally every 24 hours  dextroamphetamine-amphetamine 20 mg oral capsule, extended release: 1 cap(s) orally once a day (in the morning)  oxycodone-acetaminophen 5 mg-325 mg oral tablet: 1 tab(s) orally every 6 hours -for moderate pain MDD:4 tabs daily max

## 2022-02-17 NOTE — DISCHARGE NOTE PROVIDER - PROVIDER TOKENS
FREE:[LAST:[Dr Willett],FIRST:[Yulisa],PHONE:[(   )    -],FAX:[(   )    -],ADDRESS:[[address] [place] [address] [address]  [place] [address]]],PROVIDER:[TOKEN:[[id number]],FOLLOWUP:[1 week]],PROVIDER:[TOKEN:[[id number]],FOLLOWUP:[1 week]]

## 2022-02-17 NOTE — DISCHARGE NOTE NURSING/CASE MANAGEMENT/SOCIAL WORK - PATIENT PORTAL LINK FT
You can access the FollowMyHealth Patient Portal offered by [facility] by registering at the following website: [email or web address]. By joining [employer]’s FollowMyHealth portal, you will also be able to view your health information using other applications (apps) compatible with our system.

## 2022-02-17 NOTE — CHART NOTE - NSCHARTNOTEFT_GEN_A_CORE
Asked to evaluated pt by RN regarding "bump" on right antecubital. Pt denies pain at site. No redness, swelling or s/s of infection. Advised to treat with warm compress as needed and seek medical attention if increased redness, swelling or signs of infection. Pt is medically cleared for discharge

## 2022-02-18 LAB
CULTURE RESULTS: NO GROWTH — SIGNIFICANT CHANGE UP
SPECIMEN SOURCE: SIGNIFICANT CHANGE UP

## 2022-02-22 ENCOUNTER — APPOINTMENT (OUTPATIENT)
Dept: THORACIC SURGERY | Facility: CLINIC | Age: 52
End: 2022-02-22
Payer: COMMERCIAL

## 2022-02-22 VITALS — TEMPERATURE: 97 F

## 2022-02-22 VITALS
HEART RATE: 87 BPM | WEIGHT: 293 LBS | HEIGHT: 64 IN | SYSTOLIC BLOOD PRESSURE: 138 MMHG | OXYGEN SATURATION: 97 % | DIASTOLIC BLOOD PRESSURE: 88 MMHG | BODY MASS INDEX: 50.02 KG/M2

## 2022-02-22 PROCEDURE — 99024 POSTOP FOLLOW-UP VISIT: CPT

## 2022-02-22 RX ORDER — BUPROPION HYDROCHLORIDE 75 MG/1
75 TABLET, FILM COATED ORAL
Refills: 0 | Status: ACTIVE | COMMUNITY

## 2022-02-23 ENCOUNTER — APPOINTMENT (OUTPATIENT)
Dept: INFECTIOUS DISEASE | Facility: CLINIC | Age: 52
End: 2022-02-23
Payer: COMMERCIAL

## 2022-02-23 ENCOUNTER — TRANSCRIPTION ENCOUNTER (OUTPATIENT)
Age: 52
End: 2022-02-23

## 2022-02-23 VITALS
OXYGEN SATURATION: 96 % | TEMPERATURE: 98 F | WEIGHT: 293 LBS | HEART RATE: 91 BPM | HEIGHT: 64 IN | BODY MASS INDEX: 50.02 KG/M2 | DIASTOLIC BLOOD PRESSURE: 85 MMHG | SYSTOLIC BLOOD PRESSURE: 138 MMHG

## 2022-02-23 PROCEDURE — 99213 OFFICE O/P EST LOW 20 MIN: CPT

## 2022-02-23 NOTE — REVIEW OF SYSTEMS
[Fever] : no fever [Chills] : no chills [Cough] : no cough [SOB on Exertion] : shortness of breath during exertion [Abdominal Pain] : no abdominal pain [Diarrhea] : no diarrhea [Skin Lesions] : no skin lesions

## 2022-02-23 NOTE — HISTORY OF PRESENT ILLNESS
[FreeTextEntry1] : Infected left pleural effusion with Strep intermedius. \par s/p VATs 2/11/22. \par Doing well, on Augmentin. \par No diarrhea. \par No fevers or chills. \par Chest wall is sore but stable. \par Gets a little winded still when walking but not too bad.

## 2022-02-23 NOTE — PHYSICAL EXAM
[General Appearance - Alert] : alert [General Appearance - In No Acute Distress] : in no acute distress [Sclera] : the sclera and conjunctiva were normal [] : no respiratory distress [Respiration, Rhythm And Depth] : normal respiratory rhythm and effort [Exaggerated Use Of Accessory Muscles For Inspiration] : no accessory muscle use [Auscultation Breath Sounds / Voice Sounds] : lungs were clear to auscultation bilaterally [Bowel Sounds] : normal bowel sounds [Abdomen Soft] : soft [Abdomen Tenderness] : non-tender [FreeTextEntry1] : left chest wall tube sites healing, granular base, no acute inflammation  [No Focal Deficits] : no focal deficits [Oriented To Time, Place, And Person] : oriented to person, place, and time [Affect] : the affect was normal

## 2022-03-12 LAB
CULTURE RESULTS: SIGNIFICANT CHANGE UP
SPECIMEN SOURCE: SIGNIFICANT CHANGE UP

## 2022-03-16 DIAGNOSIS — Z23 ENCOUNTER FOR IMMUNIZATION: ICD-10-CM

## 2022-03-16 DIAGNOSIS — Z87.09 PERSONAL HISTORY OF OTHER DISEASES OF THE RESPIRATORY SYSTEM: ICD-10-CM

## 2022-03-16 NOTE — REASON FOR VISIT
[Home] : at home, [unfilled] , at the time of the visit. [Medical Office: ([facility])___] : at the medical office located in  [Verbal consent obtained from patient] : the patient, [unfilled] [Follow-Up Visit] : a follow-up visit for [Morbid Obesity (BMI>40)] : morbid obesity (bmi>40) [S/P Bariatric Surgery] : s/p bariatric surgery

## 2022-03-17 ENCOUNTER — APPOINTMENT (OUTPATIENT)
Dept: SURGERY | Facility: CLINIC | Age: 52
End: 2022-03-17

## 2022-03-17 ENCOUNTER — APPOINTMENT (OUTPATIENT)
Dept: SURGERY | Facility: CLINIC | Age: 52
End: 2022-03-17
Payer: COMMERCIAL

## 2022-03-17 VITALS — BODY MASS INDEX: 50.02 KG/M2 | WEIGHT: 293 LBS | HEIGHT: 64 IN

## 2022-03-17 DIAGNOSIS — E66.01 MORBID (SEVERE) OBESITY DUE TO EXCESS CALORIES: ICD-10-CM

## 2022-03-17 DIAGNOSIS — Z98.84 BARIATRIC SURGERY STATUS: ICD-10-CM

## 2022-03-17 DIAGNOSIS — Z01.818 ENCOUNTER FOR OTHER PREPROCEDURAL EXAMINATION: ICD-10-CM

## 2022-03-17 DIAGNOSIS — R13.19 OTHER DYSPHAGIA: ICD-10-CM

## 2022-03-17 PROCEDURE — 99204 OFFICE O/P NEW MOD 45 MIN: CPT | Mod: 95

## 2022-03-17 NOTE — HISTORY OF PRESENT ILLNESS
[Procedure: ___] : Procedure performed: [unfilled]  [Date of Surgery: ___] : Date of Surgery:   [unfilled] [Surgeon Name:   ___] : Surgeon Name: Dr. SPAIN [Pre-Op Weight ___] : Pre-op weight was [unfilled] lbs [___ Years Post Op] : [unfilled] years [de-identified] : Ms. ARI RUANO is a 51 year-old woman with history of morbid obesity and gastric band placement on 6/12/2012. DOLV 6/9/2014, with a weight of 337#, lost to follow up. Seen at [facility] on 2/7/2022 for poor po intake, vague epigastric and LUQ pain. CT abdomen/pelvis showed "O" configuration without slippage, found to have large left pleural effusion noted. Had left VATS, decortication and drainage of fluid on 2/11/2022 with Dr. Willett. \par Patient here today to discuss bariatric surgical options to include removal of band & possible conversion. Lap-band insitu, no fluid in band. \par Completely asymptomatic at this point.

## 2022-03-17 NOTE — ASSESSMENT
[FreeTextEntry1] : Previous visit weight: 337\par Today's visit weight: 336\par Today's visit BMI: 57\par \par Patient with severe obesity, BMI 57 and gastric band with resultant probable aspiration pneumonia, now asymptomatic with fluid removed from the band status post VATS procedure.

## 2022-03-17 NOTE — PHYSICAL EXAM
[Obese, well nourished, in no acute distress] : obese, well nourished, in no acute distress [Normal] : affect appropriate [de-identified] : Understood consultation

## 2022-03-17 NOTE — PLAN
[FreeTextEntry1] : Revisional weight loss surgery.  The risks, benefits and alternatives, including 1 versus two-stage removal of lap band and port and conversion to laparoscopic gastric bypass, or laparoscopic vertical sleeve gastrectomy, were discussed at length and all of her questions were answered.  The patient appears to understand and wishes to proceed.\par \par The patient was given the following instructions:\par \par 1.	She needs a complete medical evaluation including echocardiogram, stress test, pulmonary function test and evaluation for sleep apnea.\par 2.	She needs an upper endoscopy.\par \par 3.	She needs dietary and psychological evaluations.\par \par 4.	She must attend a preoperative support group meeting.\par \par 5.	She must undergo a right upper quadrant ultrasound.\par \par The patient clearly understood that surgery would only be scheduled if there are no medical or psychiatric contraindications and a second office visit is required.  \par

## 2022-03-22 ENCOUNTER — APPOINTMENT (OUTPATIENT)
Dept: THORACIC SURGERY | Facility: CLINIC | Age: 52
End: 2022-03-22
Payer: COMMERCIAL

## 2022-03-22 VITALS
OXYGEN SATURATION: 99 % | DIASTOLIC BLOOD PRESSURE: 84 MMHG | HEIGHT: 64 IN | WEIGHT: 293 LBS | SYSTOLIC BLOOD PRESSURE: 145 MMHG | BODY MASS INDEX: 50.02 KG/M2 | HEART RATE: 88 BPM

## 2022-03-22 PROCEDURE — 99024 POSTOP FOLLOW-UP VISIT: CPT

## 2022-03-22 RX ORDER — OXYCODONE HYDROCHLORIDE AND ACETAMINOPHEN 10; 325 MG/1; MG/1
TABLET ORAL
Refills: 0 | Status: DISCONTINUED | COMMUNITY
End: 2022-03-22

## 2022-03-22 RX ORDER — AMOXICILLIN 875 MG/1
TABLET, FILM COATED ORAL
Refills: 0 | Status: DISCONTINUED | COMMUNITY
End: 2022-03-22

## 2022-04-02 LAB
CULTURE RESULTS: SIGNIFICANT CHANGE UP
SPECIMEN SOURCE: SIGNIFICANT CHANGE UP

## 2022-06-02 PROBLEM — J86.9 EMPYEMA: Status: ACTIVE | Noted: 2022-02-18

## 2022-06-07 ENCOUNTER — APPOINTMENT (OUTPATIENT)
Dept: THORACIC SURGERY | Facility: CLINIC | Age: 52
End: 2022-06-07
Payer: COMMERCIAL

## 2022-06-07 VITALS
BODY MASS INDEX: 50.02 KG/M2 | WEIGHT: 293 LBS | HEART RATE: 97 BPM | SYSTOLIC BLOOD PRESSURE: 129 MMHG | DIASTOLIC BLOOD PRESSURE: 83 MMHG | HEIGHT: 64 IN

## 2022-06-07 VITALS — TEMPERATURE: 97.3 F

## 2022-06-07 VITALS — TEMPERATURE: 97.1 F

## 2022-06-07 DIAGNOSIS — J86.9 PYOTHORAX W/OUT FISTULA: ICD-10-CM

## 2022-06-07 PROCEDURE — 99214 OFFICE O/P EST MOD 30 MIN: CPT

## 2022-06-07 RX ORDER — FUROSEMIDE 20 MG/1
20 TABLET ORAL
Refills: 0 | Status: ACTIVE | COMMUNITY

## 2022-06-07 RX ORDER — POTASSIUM CHLORIDE 10 MEQ
10 CAPSULE, EXTENDED RELEASE ORAL
Refills: 0 | Status: ACTIVE | COMMUNITY

## 2022-06-07 NOTE — HISTORY OF PRESENT ILLNESS
[FreeTextEntry1] : Ms. ARI RUANO, 52 year old female, never smoker, w/ hx of gastric Lap Band 10 yrs ago, who presented with poor PO intake, nausea, epigastric/abdominal pain as well as left sided chest pain.Workup demonstrating significant, left pleural effusion. A pigtail was placed with inadequate drainage of the effusion. We placed tPA and after four doses still had some loculations that were noted on the x- ray. We elected to bring her to the OR for VATS and decortication. \par \par Pleural fluid from 2/8/22 with Strep intermedius growth (Seen by Dr. Steve Tracey (ID) while inpatient); \par \par OF NOTE: (GI symptoms subsequently resolved after deflating band).\par \par Now s/p Lt VATS, decortication, drainage of effusion on 2/11/22. Path revealed organizing fibrinous exudate. Fluid is negative for carcinoma. Cx and gram stain - negative to date\par \par Discharged on 4 week course of Augmentin. \par \par CXR: reviewed. \par \par Patient is here today for a follow up. Patient c/o SOB on exertion, denies cough, chest pain, fever, chills. c/o bilateral lower extremity swelling, was given Lasix 10 mg by PCP with relief 2 weeks, noted swelling again, will f/u with PCP.

## 2022-06-07 NOTE — ASSESSMENT
[FreeTextEntry1] : Ms. AIR RUANO, 52 year old female, never smoker, w/ hx of gastric Lap Band 10 yrs ago, who presented with poor PO intake, nausea, epigastric/abdominal pain as well as left sided chest pain.Workup demonstrating significant, left pleural effusion. A pigtail was placed with inadequate drainage of the effusion. We placed tPA and after four doses still had some loculations that were noted on the x- ray. We elected to bring her to the OR for VATS and decortication. \par \par Pleural fluid from 2/8/22 with Strep intermedius growth (Seen by Dr. Steve Tracey (ID) while inpatient); \par \par OF NOTE: (GI symptoms subsequently resolved after deflating band).\par \par Now s/p Lt VATS, decortication, drainage of effusion on 2/11/22. Path revealed organizing fibrinous exudate. Fluid is negative for carcinoma. Cx and gram stain - negative to date\par \par Discharged on 4 week course of Augmentin. \par \par CXR: reviewed. \par \par I have reviewed the patient's medical records and diagnostic images at time of this office consultation and have made the following recommendation:\par 1. CXR reviewed, patient is doing well, I recommended patient to f/u as needed. \par 2. From thoracic standpoint, patient can proceed with proposed surgery. \par \par I personally performed the services described in the documentation, reviewed the documentation recorded by the scribe in my presence and it accurately and completely records my words and actions.\par \par I, Cheri Cain NP, am scribing for and the presence of AJIT Bryson, the following sections HISTORY OF PRESENT ILLNESS, PAST MEDICAL/FAMILY/SOCIAL HISTORY; REVIEW OF SYSTEMS; VITAL SIGNS; PHYSICAL EXAM; DISPOSITION. \par

## 2022-07-25 ENCOUNTER — APPOINTMENT (OUTPATIENT)
Dept: GASTROENTEROLOGY | Facility: CLINIC | Age: 52
End: 2022-07-25

## 2022-10-28 NOTE — PATIENT PROFILE ADULT - BRAND OF COVID-19 VACCINATION
Received request for medical records. Faxed to HIM and received confirmation.    Moderna dose 1 and 2

## 2022-11-10 RX ORDER — BUPROPION HYDROCHLORIDE 150 MG/1
1 TABLET, EXTENDED RELEASE ORAL
Qty: 0 | Refills: 0 | DISCHARGE

## 2022-11-10 RX ORDER — DEXTROAMPHETAMINE SACCHARATE, AMPHETAMINE ASPARTATE, DEXTROAMPHETAMINE SULFATE AND AMPHETAMINE SULFATE 1.875; 1.875; 1.875; 1.875 MG/1; MG/1; MG/1; MG/1
1 TABLET ORAL
Qty: 0 | Refills: 0 | DISCHARGE

## 2023-05-05 PROBLEM — E66.01 MORBID (SEVERE) OBESITY DUE TO EXCESS CALORIES: Chronic | Status: ACTIVE | Noted: 2022-02-07

## 2023-05-05 PROBLEM — F41.9 ANXIETY DISORDER, UNSPECIFIED: Chronic | Status: ACTIVE | Noted: 2022-02-07

## 2023-05-05 PROBLEM — F90.9 ATTENTION-DEFICIT HYPERACTIVITY DISORDER, UNSPECIFIED TYPE: Chronic | Status: ACTIVE | Noted: 2022-02-07

## 2023-06-16 ENCOUNTER — APPOINTMENT (OUTPATIENT)
Dept: OBGYN | Facility: CLINIC | Age: 53
End: 2023-06-16

## 2023-07-11 DIAGNOSIS — Z13.21 ENCOUNTER FOR SCREENING FOR NUTRITIONAL DISORDER: ICD-10-CM

## 2023-07-11 DIAGNOSIS — Z13.21 ENCOUNTER FOR SCREENING FOR OTHER SUSPECTED ENDOCRINE DISORDER: ICD-10-CM

## 2023-07-11 DIAGNOSIS — Z13.0 ENCOUNTER FOR SCREENING FOR OTHER SUSPECTED ENDOCRINE DISORDER: ICD-10-CM

## 2023-07-11 DIAGNOSIS — Z13.228 ENCOUNTER FOR SCREENING FOR OTHER SUSPECTED ENDOCRINE DISORDER: ICD-10-CM

## 2023-07-11 DIAGNOSIS — Z13.29 ENCOUNTER FOR SCREENING FOR OTHER SUSPECTED ENDOCRINE DISORDER: ICD-10-CM

## 2023-07-19 NOTE — H&P ADULT - PROBLEM SELECTOR PROBLEM 1
Loculated pleural effusion Gen. minimal distress secondary to pain, no respiratory distress  HEENT: Pupils equally round reactive to light extraocular muscles are intact  Lungs: Bilateral breath sounds  CVS: S1S2   Abd; soft nontender nondistended   Ext: No edema no erythema no deformity distal neurovascular intact  Neuro: Wake alert and oriented x3 no focal deficits steady gait,  MSK: Strength 5/5 bilateral upper and lower extremities

## 2023-07-27 ENCOUNTER — APPOINTMENT (OUTPATIENT)
Dept: SURGERY | Facility: CLINIC | Age: 53
End: 2023-07-27

## 2024-04-18 ENCOUNTER — APPOINTMENT (OUTPATIENT)
Dept: RHEUMATOLOGY | Facility: CLINIC | Age: 54
End: 2024-04-18

## 2025-01-29 ENCOUNTER — APPOINTMENT (OUTPATIENT)
Dept: INTERNAL MEDICINE | Facility: CLINIC | Age: 55
End: 2025-01-29

## 2025-07-03 NOTE — ED ADULT NURSE NOTE - SEPSIS REFERENCE DATA CRITERIA 1
Problem: Adult Inpatient Plan of Care  Goal: Plan of Care Review  Outcome: Progressing  Goal: Patient-Specific Goal (Individualized)  Outcome: Progressing  Goal: Absence of Hospital-Acquired Illness or Injury  Outcome: Progressing  Goal: Optimal Comfort and Wellbeing  Outcome: Progressing  Goal: Readiness for Transition of Care  Outcome: Progressing     Problem: Diabetes Comorbidity  Goal: Blood Glucose Level Within Targeted Range  Outcome: Progressing     Problem: Wound  Goal: Optimal Coping  Outcome: Progressing  Goal: Optimal Functional Ability  Outcome: Progressing  Goal: Absence of Infection Signs and Symptoms  Outcome: Progressing  Goal: Improved Oral Intake  Outcome: Progressing  Goal: Optimal Pain Control and Function  Outcome: Progressing  Goal: Skin Health and Integrity  Outcome: Progressing  Goal: Optimal Wound Healing  Outcome: Progressing     Problem: Fall Injury Risk  Goal: Absence of Fall and Fall-Related Injury  Outcome: Progressing      Abormal VS: Temp > 100F or < 96.8F; SBP < 90 mmHG; HR > 120bpm; Resp > 24/min

## 2025-07-17 NOTE — ED PROVIDER NOTE - ATTENDING CONTRIBUTION TO CARE
Additional Notes: Rheumatologist has prescribed cosentyx for psoriatic arthritis
Detail Level: Simple
Render Risk Assessment In Note?: no
I saw and evaluated patient with resident. I discussed H+P and MDM with resident. I agree with the statements made by the resident unless otherwise noted.

## (undated) DEVICE — NDL HYPO SAFE 22G X 1.5" (BLACK)

## (undated) DEVICE — ENDOCATCH II 15MM

## (undated) DEVICE — BLADE SCALPEL SAFETYLOCK #10

## (undated) DEVICE — TAPE SILK 3"

## (undated) DEVICE — DRSG OPSITE 13.75 X 4"

## (undated) DEVICE — BUR WIRE PASSER MED 1.5MMX 19MM

## (undated) DEVICE — POSITIONER FOAM EGG CRATE ULNAR 2PCS (PINK)

## (undated) DEVICE — APPLICATOR FOR PROGEL EXTENDED SPRAY TIP 29CM

## (undated) DEVICE — ENDOCATCH 10MM SPECIMEN POUCH

## (undated) DEVICE — DRSG TEGADERM 6"X8"

## (undated) DEVICE — GLV 7 PROTEXIS (WHITE)

## (undated) DEVICE — PREP CHLORAPREP HI-LITE ORANGE 26ML

## (undated) DEVICE — DRAPE IOBAN 23" X 23"

## (undated) DEVICE — SUT POLYSORB 2-0 27" GS-21

## (undated) DEVICE — SCOPE WARMER SEAL DISP

## (undated) DEVICE — SUT PROLENE 4-0 36" BB

## (undated) DEVICE — BLADE SCALPEL SAFETYLOCK #11

## (undated) DEVICE — GLV 7.5 PROTEXIS (WHITE)

## (undated) DEVICE — TRAP SPECIMEN SPUTUM 40CC

## (undated) DEVICE — BLADE SCALPEL SAFETYLOCK #15

## (undated) DEVICE — DRAPE INSTRUMENT POUCH 6.75" X 11"

## (undated) DEVICE — SUT POLYSORB 0 36" GS-25 UNDYED

## (undated) DEVICE — SUT SOFSILK 2-0 30" V-20

## (undated) DEVICE — DRAPE TOWEL BLUE 17" X 24"

## (undated) DEVICE — DRAPE MAYO STAND 30"

## (undated) DEVICE — APPLICATOR FOR ARISTA XL 38CM

## (undated) DEVICE — STAPLER COVIDIEN ENDO GIA SHORT HANDLE

## (undated) DEVICE — DRAIN PLEUROVAC

## (undated) DEVICE — GLV 8 PROTEXIS (WHITE)

## (undated) DEVICE — WARMING BLANKET LOWER ADULT

## (undated) DEVICE — SYR LUER LOK 10CC

## (undated) DEVICE — SUT POLYSORB 2 30" GS-26

## (undated) DEVICE — GLV 6.5 PROTEXIS (WHITE)

## (undated) DEVICE — GOWN TRIMAX LG

## (undated) DEVICE — Device

## (undated) DEVICE — SUCTION YANKAUER NO CONTROL VENT

## (undated) DEVICE — SOL ANTI FOG

## (undated) DEVICE — SPECIMEN CONTAINER 100ML

## (undated) DEVICE — GLV 8.5 PROTEXIS (WHITE)

## (undated) DEVICE — FOLEY TRAY 16FR 5CC LTX UMETER CLOSED

## (undated) DEVICE — DRSG CURITY GAUZE SPONGE 4 X 4" 12-PLY

## (undated) DEVICE — VENODYNE/SCD SLEEVE CALF LARGE

## (undated) DEVICE — SUT PDS II 0 27" OS-6

## (undated) DEVICE — DRSG STERISTRIPS 0.5 X 4"

## (undated) DEVICE — STAPLER COVIDIEN ENDO GIA STANDARD HANDLE

## (undated) DEVICE — DRAPE MAGNETIC INSTRUMENT MEDIUM

## (undated) DEVICE — SUT SOFSILK 0 30" V-20

## (undated) DEVICE — SUT POLYSORB 0 30" GS-21 UNDYED

## (undated) DEVICE — STAPLER SKIN VISI-STAT 35 WIDE

## (undated) DEVICE — TUBING SUCTION 20FT

## (undated) DEVICE — SUT BIOSYN 4-0 18" P-12

## (undated) DEVICE — DRAIN 24 FR ROUND HUBLESS FULL FLUTED SILICONE

## (undated) DEVICE — TUBE ASPIRATION LUKI

## (undated) DEVICE — DISSECTOR ENDO PEANUT 5MM

## (undated) DEVICE — SOL IRR POUR H2O 250ML

## (undated) DEVICE — ELCTR BOVIE TIP BLADE INSULATED 2.75" EDGE

## (undated) DEVICE — MEDICATION LABELS W MARKER

## (undated) DEVICE — ELCTR BOVIE TIP BLADE INSULATED 6.5" EDGE

## (undated) DEVICE — SOL IRR POUR NS 0.9% 500ML

## (undated) DEVICE — D HELP - CLEARVIEW CLEARIFY SYSTEM